# Patient Record
Sex: FEMALE | Race: WHITE | NOT HISPANIC OR LATINO | Employment: FULL TIME | URBAN - METROPOLITAN AREA
[De-identification: names, ages, dates, MRNs, and addresses within clinical notes are randomized per-mention and may not be internally consistent; named-entity substitution may affect disease eponyms.]

---

## 2017-01-09 ENCOUNTER — TELEPHONE (OUTPATIENT)
Dept: OBSTETRICS AND GYNECOLOGY | Facility: CLINIC | Age: 27
End: 2017-01-09

## 2017-01-09 NOTE — TELEPHONE ENCOUNTER
----- Message from Kaleigh Alvarado sent at 1/9/2017 11:12 AM CST -----  Pt states she need to talk to you about a glucose test and a rohgem shot. Please call pt back at [phone] or at [phone] ext [phone].

## 2017-01-09 NOTE — TELEPHONE ENCOUNTER
Advised that we will do her sugar and rhogam at 28 wks,  And will see her at her 24 wks weeks and then will make those appt after that tdf

## 2017-02-01 ENCOUNTER — ROUTINE PRENATAL (OUTPATIENT)
Dept: OBSTETRICS AND GYNECOLOGY | Facility: CLINIC | Age: 27
End: 2017-02-01
Payer: OTHER GOVERNMENT

## 2017-02-01 VITALS
WEIGHT: 150.81 LBS | BODY MASS INDEX: 28.49 KG/M2 | SYSTOLIC BLOOD PRESSURE: 118 MMHG | DIASTOLIC BLOOD PRESSURE: 60 MMHG

## 2017-02-01 DIAGNOSIS — Z67.91 RH NEGATIVE STATE IN ANTEPARTUM PERIOD, SECOND TRIMESTER: ICD-10-CM

## 2017-02-01 DIAGNOSIS — R30.0 DYSURIA DURING PREGNANCY IN SECOND TRIMESTER: ICD-10-CM

## 2017-02-01 DIAGNOSIS — O26.892 RH NEGATIVE STATE IN ANTEPARTUM PERIOD, SECOND TRIMESTER: ICD-10-CM

## 2017-02-01 DIAGNOSIS — Z34.80 SUPERVISION OF OTHER NORMAL PREGNANCY: Primary | ICD-10-CM

## 2017-02-01 DIAGNOSIS — O26.892 DYSURIA DURING PREGNANCY IN SECOND TRIMESTER: ICD-10-CM

## 2017-02-01 LAB
BILIRUB UR QL STRIP: NEGATIVE
CLARITY UR: ABNORMAL
COLOR UR: YELLOW
GLUCOSE UR QL STRIP: NEGATIVE
HGB UR QL STRIP: NEGATIVE
KETONES UR QL STRIP: NEGATIVE
LEUKOCYTE ESTERASE UR QL STRIP: NEGATIVE
NITRITE UR QL STRIP: NEGATIVE
PH UR STRIP: 8 [PH] (ref 5–8)
PROT UR QL STRIP: NEGATIVE
SP GR UR STRIP: 1.02 (ref 1–1.03)
URN SPEC COLLECT METH UR: ABNORMAL

## 2017-02-01 PROCEDURE — 90715 TDAP VACCINE 7 YRS/> IM: CPT | Mod: PBBFAC,PO | Performed by: ADVANCED PRACTICE MIDWIFE

## 2017-02-01 PROCEDURE — 81003 URINALYSIS AUTO W/O SCOPE: CPT | Mod: PO

## 2017-02-01 PROCEDURE — 0502F SUBSEQUENT PRENATAL CARE: CPT | Mod: ,,, | Performed by: ADVANCED PRACTICE MIDWIFE

## 2017-02-01 PROCEDURE — 90471 IMMUNIZATION ADMIN: CPT | Mod: PBBFAC,PO | Performed by: ADVANCED PRACTICE MIDWIFE

## 2017-02-01 PROCEDURE — 99999 PR PBB SHADOW E&M-EST. PATIENT-LVL III: CPT | Mod: PBBFAC,,, | Performed by: ADVANCED PRACTICE MIDWIFE

## 2017-02-01 PROCEDURE — 99213 OFFICE O/P EST LOW 20 MIN: CPT | Mod: PBBFAC,PO | Performed by: ADVANCED PRACTICE MIDWIFE

## 2017-02-01 PROCEDURE — 87086 URINE CULTURE/COLONY COUNT: CPT

## 2017-02-01 RX ORDER — ACETAMINOPHEN 325 MG/1
325 TABLET ORAL EVERY 6 HOURS PRN
Status: ON HOLD | COMMUNITY
End: 2017-05-21 | Stop reason: HOSPADM

## 2017-02-01 RX ORDER — NITROFURANTOIN 25; 75 MG/1; MG/1
100 CAPSULE ORAL 2 TIMES DAILY
Qty: 14 CAPSULE | Refills: 0 | Status: SHIPPED | OUTPATIENT
Start: 2017-02-01 | End: 2017-02-01 | Stop reason: SDUPTHER

## 2017-02-01 RX ORDER — LISDEXAMFETAMINE DIMESYLATE 50 MG/1
CAPSULE ORAL
COMMUNITY
Start: 2016-06-25 | End: 2017-03-15

## 2017-02-01 RX ORDER — NITROFURANTOIN 25; 75 MG/1; MG/1
100 CAPSULE ORAL 2 TIMES DAILY
Qty: 14 CAPSULE | Refills: 0 | Status: SHIPPED | OUTPATIENT
Start: 2017-02-01 | End: 2017-02-08

## 2017-02-01 RX ORDER — FLUOXETINE HYDROCHLORIDE 40 MG/1
40 CAPSULE ORAL
COMMUNITY
Start: 2016-08-02 | End: 2017-03-15

## 2017-02-01 RX ORDER — CLINDAMYCIN PHOSPHATE 10 MG/G
GEL TOPICAL
COMMUNITY
Start: 2016-03-04 | End: 2017-03-15

## 2017-02-01 NOTE — MR AVS SNAPSHOT
[facility]  [address] [facility] [address]  [place] [address]  Phone: [phone]  Fax: [phone]                  Venessa Hernandez   2017 8:00 AM   Routine Prenatal    Description:  Female : 1990   Provider:  Anneliese Ott CNM   Department:  [facility]           Reason for Visit     Routine Prenatal Visit           Diagnoses this Visit        Comments    Supervision of other normal pregnancy    -  Primary     Dysuria during pregnancy in second trimester         Rh negative state in antepartum period, second trimester                To Do List           Future Appointments        Provider Department Dept Phone    2017 8:00 AM Anneliese Ott CNM [facility] - OB/ -[phone]    2017 10:00 AM LABORATORY, [facility] [phone]      Goals (5 Years of Data)     None       These Medications        Disp Refills Start End    nitrofurantoin, macrocrystal-monohydrate, (MACROBID) 100 MG capsule 14 capsule 0 2017    Take 1 capsule (100 mg total) by mouth 2 (two) times daily. - Oral    Pharmacy: [facility]/pharm[facility] #[id number] - [place] - [address] Ph #: [phone]         [facility]     [facility] - 24/7 Assistance  Registered nurses in the [facility] provide clinical advisement, health education, appointment booking, and other advisory services.  Call for this free service at [phone].             Medications           Message regarding Medications     Verify the changes and/or additions to your medication regime listed below are the same as discussed with your clinician today.  If any of these changes or additions are incorrect, please notify your healthcare provider.        START taking these NEW medications        Refills    nitrofurantoin, macrocrystal-monohydrate, (MACROBID) 100 MG capsule 0    Sig: Take 1 capsule (100 mg total) by mouth 2 (two) times daily.    Class: Normal    Route: Oral            Verify that the below list of medications is an accurate representation of the medications you are currently taking.  If none reported, the list may be blank. If incorrect, please contact your healthcare provider. Carry this list with you in case of emergency.           Current Medications     acetaminophen (TYLENOL) 325 MG tablet Take 325 mg by mouth every 6 (six) hours as needed for Pain.    PNV62/FA/OM3/DHA/EPA/FISH OIL (PRENATAL GUMMY ORAL) Take by mouth.    clindamycin phosphate 1% (CLINDAGEL) 1 % gel Apply to the affected area daily.    doxylamine-pyridoxine 10-10 mg TbEC Take 1 tablet by mouth 2 (two) times daily.    fluoxetine (PROZAC) 40 MG capsule Take 40 mg by mouth.    fluoxetine (PROZAC) 40 MG capsule Take 40 mg by mouth.    lisdexamfetamine (VYVANSE) 50 MG capsule TAKE ONE CAPSULE BY MOUTH DAILY FOR 30 DAYS    nitrofurantoin, macrocrystal-monohydrate, (MACROBID) 100 MG capsule Take 1 capsule (100 mg total) by mouth 2 (two) times daily.    ondansetron (ZOFRAN, AS HYDROCHLORIDE,) 4 MG tablet Take 1 tablet (4 mg total) by mouth daily as needed for Nausea.    VYVANSE 50 mg capsule TAKE ONE CAPSULE BY MOUTH DAILY FOR 30 DAYS           Clinical Reference Information           Prenatal Vitals     Enc. Date GA Prenatal Vitals Prenatal Pulse Pain Level Urine Albumin/Glucose Edema Presentation Dilation/Effacement/Station    2/1/17 24w5d 118/60 / 68.4 kg (150 lb 12.7 oz) 25 cm / 151 / Present   Trace / Negative None / None / None      12/28/16 19w5d 118/60 / 64 kg (141 lb 1.5 oz) 20 cm / us / Present    None / None / None      11/30/16 15w5d 118/62 / 62 kg (136 lb 11 oz) 16 cm / 152 / Present    None / None / None      10/14/16 9w0d 120/60 / 133 kg (293 lb 3.4 oz)  / us 180 / Absent    None / None / None  0      Vital Signs - Last Recorded  Most recent update: 2/1/2017  8:28 AM by Carol Marrufo MA    BP Wt LMP BMI       118/60 68.4 kg (150 lb 12.7 oz) 08/12/2016 28.49 kg/m2       Allergies as of  2017     No Known Allergies      Immunizations Administered on Date of Encounter - 2017     Name Date Dose VIS Date Route    TDAP  Incomplete 0.5 mL 2015 Intramuscular      Orders Placed During Today's Visit      Normal Orders This Visit    Rho(D) Immune Globulin     Tdap Vaccine (Adult)     Urinalysis     Urine culture     Future Labs/Procedures Expected by Expires    CBC auto differential  2017    OB Glucose Screen  2017    Type & Screen  2017    HIV-1 and HIV-2 antibodies  3/1/2017 2018    RPR  3/1/2017 2018      Instructions      Nutrition During Pregnancy    Having a healthy baby depends mostly on you. What you eat matters to your baby and your health. During pregnancy, you will likely need about 300 more calories per day than before you became pregnant. Each day, try to eat the number of servings listed here for each food group. In addition, cut down on salt and caffeine. Limit the amount of sweets and high-fat foods you eat. Dont smoke or drink alcohol.  Important: See your healthcare provider as often as requested. If you have any questions, be sure to ask them.  Fruits  2 cups  Examples of 1-cup servings:  1 medium apple  1 medium orange  1 medium banana  1 cup chopped fruit  1 cup 100% fruit juice (pasteurized)  1/2 cup dried fruit Vegetables  2-1/2 to 3 cups   Examples of 1 servin cups raw, leafy greens  1 cup raw or cooked cut-up vegetables  1 cup 100% vegetable juice (pasteurized) Grains & Cereals*  6 to 8 ounces  Examples of 1-ounce servings:  1 slice bread  1/2 cup cooked rice  1/2 cup cooked cereal  1/2 cup pasta  1 ounce cold cereal Fats & Oils  6 to 8 teaspoons   Dairy**  3 cups  Examples of 1-cup servings:  1 cup milk  1 cup yogurt  1-1/2 ounces natural cheese  2 ounces processed cheese Protein---  5 to 6-1/2 ounces  Examples of 1-ounce servings:  1 egg  1 ounce of lean meat, poultry, or fish  1/4 cup cooked beans  1 tablespoon  peanut butter  1/2 ounce nuts Fluids  8 or more 8-ounce glasses  Examples:  Water  Diluted juices: Apple, orange, cranberry  Mineral water  Clear soups, broth     *Note: Choose whole grains whenever possible.  ** Note: Try to choose low-fat options; avoid soft cheeses and unpasteurized milk.  --- Notes: Avoid raw or undercooked meats, eggs, and seafood. fish, and shellfish. Also, some types of fish, like shark, swordfish, and imtiaz mackerel should not be eaten during pregnancy. Avoid hot dogs, luncheon meats, and cold cuts unless heated to steaming just prior to being served. Ask your healthcare provider about safe choices.     Prenatal supplements  A prenatal supplement is a pill that you take daily during pregnancy. It helps make sure youre getting the right amount of certain nutrients that are important to your baby. Ask your healthcare provider to help you choose the best one for you. Important nutrients during pregnancy include:  · Folic acid. It's best to start taking this supplement 1 month before you start trying to get pregnant. Folic acid helps prevent certain problems in your baby. During pregnancy, you need to take 400 micrograms (mcg) of folic acid every day for the first 2 to 3 months after conception, and then 600 mcg is needed for growing fetus and placenta.  · Iron, calcium, and vitamin D. You may also be advised to take these supplements during pregnancy. They help keep you and your baby healthy. Be sure to take them at different times because calcium makes it hard for the body to absorb iron. Taking iron with orange juice helps to increase its absorption.   © 7693-6604 [employer]. [address] [place] [address], [place] [address]. All rights reserved. This information is not intended as a substitute for professional medical care. Always follow your healthcare professional's instructions.        Pregnancy: Your Second Trimester Changes    Each day, you and your baby are changing and growing  together. Heres a quick look at whats happening to both of you.  How You Are Changing  Even when you dont notice it, your body is adapting to meet the needs of your growing baby. The changes in your body might also affect your moods.  Your body  Your uterus expands as baby grows. As the weeks go by, you will feel more pressure on your bladder, stomach, and other organs. You may notice some skin color changes on your forehead, nose, or cheeks. Freckles may darken, and moles may grow. You may notice a darker line on your abdomen between your belly button and pubic bone in the midline.  Your moods  The second trimester is often easier than the first. Still, be prepared for mood swings. These are due to the increase in hormones (chemicals that affect the way organs work) produced by your body. These mood swings are a normal part of pregnancy.  How your baby is growing       Month 4  Babys heartbeat may be heard with a Doppler (hand-held ultrasound device) by 9 to 10 weeks. Eyebrows, eyelashes and fingernails begin to form.  Month 5  You may feel your baby move. After a growth spurt, your baby nears 10 inches. Month 6  Babys fingerprints have formed. Your baby weighs about 1  to 2 pounds and is about 12 inches long.   © 6261-8796 [employer]. [address] [place] [address], [place] [address]. All rights reserved. This information is not intended as a substitute for professional medical care. Always follow your healthcare professional's instructions.        If You Are Rh Negative     A Rho(D) immune globulin injection protects against Rh disease in this and future pregnancies.   If youre Rh negative, ask your healthcare provider about getting treated with Rho(D) immune globulin. Even if you miscarry or dont deliver the baby, you will still need treatment. The health of any baby you have in the future depends on it.  When are you treated?  If your blood has not formed Rh antibodies, youll be treated during  week 28 of your pregnancy. You also may be treated any time theres a chance that fetal blood has mixed with yours. For example, this might be after an amniocentesis, a prenatal test. Or it might be if you have vaginal bleeding earlier than 28 weeks. Treatment is an injection of a medicine called Rho(D) immune globulin. Rho(D) immune globulin stops Rh antibodies from forming. It wont harm you or the fetus. After you give birth, your babys blood will be tested. If its Rh positive, youll be given Rho(D) immune globulin again within 3 days. If its Rh negative, you wont need Rho(D) immune globulin until your next pregnancy.  Preventing future problems  Your chance of forming Rh antibodies increases with each pregnancy. This is true even for an ectopic pregnancy (the fertilized egg is outside the uterus). It is also true for pregnancies that end in miscarriage or . In these cases, you will most likely get a Rho(D) immune globulin injection. This is because your body can make Rh antibodies even if you dont deliver a baby. Rh antibodies can cause problems in future pregnancies.  If you have Rh antibodies  If antibodies have already formed (sensitization), Rho(D) immune globulin cant protect the fetus. You and the fetus will need special care during pregnancy. Your healthcare provider will explain the details to you.   © 4060-0409 [employer]. [address] [place] [address], [place] [address]. All rights reserved. This information is not intended as a substitute for professional medical care. Always follow your healthcare professional's instructions.        Rh-Negative Screening    If you have Rh-negative blood, your baby may be at risk for health problems. This is true only if your baby has Rh-positive blood. A simple test followed by treatment can help prevent problems.  What are the risks?  If the blood of your baby is Rh positive, your Rh-negative blood may form antibodies. These antibodies will  attack the Rh-positive blood. This is called Rh disease. Rh disease can cause your baby to lose blood cells or have other health problems. Medical treatment can prevent Rh disease by keeping antibodies from forming.  How are you tested?  A simple blood test shows if youre Rh negative. This test is done very early in your pregnancy. If youre Rh negative, youll have a second blood test near week 28 of pregnancy. This test will check whether or not your blood contains Rh antibodies.  © 4518-0163 [employer]. [address] [place] [address], [place] [address]. All rights reserved. This information is not intended as a substitute for professional medical care. Always follow your healthcare professional's instructions.

## 2017-02-01 NOTE — PATIENT INSTRUCTIONS
Nutrition During Pregnancy    Having a healthy baby depends mostly on you. What you eat matters to your baby and your health. During pregnancy, you will likely need about 300 more calories per day than before you became pregnant. Each day, try to eat the number of servings listed here for each food group. In addition, cut down on salt and caffeine. Limit the amount of sweets and high-fat foods you eat. Dont smoke or drink alcohol.  Important: See your healthcare provider as often as requested. If you have any questions, be sure to ask them.  Fruits  2 cups  Examples of 1-cup servings:  1 medium apple  1 medium orange  1 medium banana  1 cup chopped fruit  1 cup 100% fruit juice (pasteurized)  1/2 cup dried fruit Vegetables  2-1/2 to 3 cups   Examples of 1 servin cups raw, leafy greens  1 cup raw or cooked cut-up vegetables  1 cup 100% vegetable juice (pasteurized) Grains & Cereals*  6 to 8 ounces  Examples of 1-ounce servings:  1 slice bread  1/2 cup cooked rice  1/2 cup cooked cereal  1/2 cup pasta  1 ounce cold cereal Fats & Oils  6 to 8 teaspoons   Dairy**  3 cups  Examples of 1-cup servings:  1 cup milk  1 cup yogurt  1-1/2 ounces natural cheese  2 ounces processed cheese Protein---  5 to 6-1/2 ounces  Examples of 1-ounce servings:  1 egg  1 ounce of lean meat, poultry, or fish  1/4 cup cooked beans  1 tablespoon peanut butter  1/2 ounce nuts Fluids  8 or more 8-ounce glasses  Examples:  Water  Diluted juices: Apple, orange, cranberry  Mineral water  Clear soups, broth     *Note: Choose whole grains whenever possible.  ** Note: Try to choose low-fat options; avoid soft cheeses and unpasteurized milk.  --- Notes: Avoid raw or undercooked meats, eggs, and seafood. fish, and shellfish. Also, some types of fish, like shark, swordfish, and imtiaz mackerel should not be eaten during pregnancy. Avoid hot dogs, luncheon meats, and cold cuts unless heated to steaming just prior to being served. Ask your healthcare  provider about safe choices.     Prenatal supplements  A prenatal supplement is a pill that you take daily during pregnancy. It helps make sure youre getting the right amount of certain nutrients that are important to your baby. Ask your healthcare provider to help you choose the best one for you. Important nutrients during pregnancy include:  · Folic acid. It's best to start taking this supplement 1 month before you start trying to get pregnant. Folic acid helps prevent certain problems in your baby. During pregnancy, you need to take 400 micrograms (mcg) of folic acid every day for the first 2 to 3 months after conception, and then 600 mcg is needed for growing fetus and placenta.  · Iron, calcium, and vitamin D. You may also be advised to take these supplements during pregnancy. They help keep you and your baby healthy. Be sure to take them at different times because calcium makes it hard for the body to absorb iron. Taking iron with orange juice helps to increase its absorption.   © 3244-3362 [employer]. [address] [place] [address]. All rights reserved. This information is not intended as a substitute for professional medical care. Always follow your healthcare professional's instructions.        Pregnancy: Your Second Trimester Changes    Each day, you and your baby are changing and growing together. Heres a quick look at whats happening to both of you.  How You Are Changing  Even when you dont notice it, your body is adapting to meet the needs of your growing baby. The changes in your body might also affect your moods.  Your body  Your uterus expands as baby grows. As the weeks go by, you will feel more pressure on your bladder, stomach, and other organs. You may notice some skin color changes on your forehead, nose, or cheeks. Freckles may darken, and moles may grow. You may notice a darker line on your abdomen between your belly button and pubic bone in the midline.  Your  moods  The second trimester is often easier than the first. Still, be prepared for mood swings. These are due to the increase in hormones (chemicals that affect the way organs work) produced by your body. These mood swings are a normal part of pregnancy.  How your baby is growing       Month 4  Babys heartbeat may be heard with a Doppler (hand-held ultrasound device) by 9 to 10 weeks. Eyebrows, eyelashes and fingernails begin to form.  Month 5  You may feel your baby move. After a growth spurt, your baby nears 10 inches. Month 6  Babys fingerprints have formed. Your baby weighs about 1  to 2 pounds and is about 12 inches long.   © 4872-1689 [employer]. [address] [place] [address], [place] [address]. All rights reserved. This information is not intended as a substitute for professional medical care. Always follow your healthcare professional's instructions.        If You Are Rh Negative     A Rho(D) immune globulin injection protects against Rh disease in this and future pregnancies.   If youre Rh negative, ask your healthcare provider about getting treated with Rho(D) immune globulin. Even if you miscarry or dont deliver the baby, you will still need treatment. The health of any baby you have in the future depends on it.  When are you treated?  If your blood has not formed Rh antibodies, youll be treated during week 28 of your pregnancy. You also may be treated any time theres a chance that fetal blood has mixed with yours. For example, this might be after an amniocentesis, a prenatal test. Or it might be if you have vaginal bleeding earlier than 28 weeks. Treatment is an injection of a medicine called Rho(D) immune globulin. Rho(D) immune globulin stops Rh antibodies from forming. It wont harm you or the fetus. After you give birth, your babys blood will be tested. If its Rh positive, youll be given Rho(D) immune globulin again within 3 days. If its Rh negative, you wont need Rho(D) immune  globulin until your next pregnancy.  Preventing future problems  Your chance of forming Rh antibodies increases with each pregnancy. This is true even for an ectopic pregnancy (the fertilized egg is outside the uterus). It is also true for pregnancies that end in miscarriage or . In these cases, you will most likely get a Rho(D) immune globulin injection. This is because your body can make Rh antibodies even if you dont deliver a baby. Rh antibodies can cause problems in future pregnancies.  If you have Rh antibodies  If antibodies have already formed (sensitization), Rho(D) immune globulin cant protect the fetus. You and the fetus will need special care during pregnancy. Your healthcare provider will explain the details to you.   © 0955-4804 [employer]. [address] [place] [address]. All rights reserved. This information is not intended as a substitute for professional medical care. Always follow your healthcare professional's instructions.        Rh-Negative Screening    If you have Rh-negative blood, your baby may be at risk for health problems. This is true only if your baby has Rh-positive blood. A simple test followed by treatment can help prevent problems.  What are the risks?  If the blood of your baby is Rh positive, your Rh-negative blood may form antibodies. These antibodies will attack the Rh-positive blood. This is called Rh disease. Rh disease can cause your baby to lose blood cells or have other health problems. Medical treatment can prevent Rh disease by keeping antibodies from forming.  How are you tested?  A simple blood test shows if youre Rh negative. This test is done very early in your pregnancy. If youre Rh negative, youll have a second blood test near week 28 of pregnancy. This test will check whether or not your blood contains Rh antibodies.  © 6645-5188 [employer]. [address] [place] [address]. All rights reserved. This  information is not intended as a substitute for professional medical care. Always follow your healthcare professional's instructions.

## 2017-02-01 NOTE — PROGRESS NOTES
28 week lab with RhoGam work up in 3 weeks  UTI symptoms x 1 week  will send UA and C/S and start Macrobid  Tdap today  Natural family planning    Coffective counseling sheet [employer] Love discussed with mother. Reinforced immediate skin to skin, the magic first hour, importance of the first feeding and delaying routine procedures. Encouraged mother to download [employer] mobile nicci if she has not already done so. Mother verbalizes understanding.

## 2017-02-01 NOTE — PROGRESS NOTES
Tdap given IM to left deltoid.  Pt tolerated well.  Pt advised to wait 10-15 minutes for s/s of medication reaction, pt declined.  RUTHIE STALEYN

## 2017-02-02 LAB — BACTERIA UR CULT: NO GROWTH

## 2017-02-16 ENCOUNTER — PATIENT MESSAGE (OUTPATIENT)
Dept: OBSTETRICS AND GYNECOLOGY | Facility: CLINIC | Age: 27
End: 2017-02-16

## 2017-02-17 ENCOUNTER — TELEPHONE (OUTPATIENT)
Dept: OBSTETRICS AND GYNECOLOGY | Facility: CLINIC | Age: 27
End: 2017-02-17

## 2017-02-17 NOTE — TELEPHONE ENCOUNTER
"Pt states at her last visit she c/o felix king.Lasting for 20- 40 seconds, has had about 4 an hour. Also states she has a thin, clear, odorless discharge as of today. "Almost looks like I wet my pants" . Pt isn't scheduled until 2/22/17, needs to know if she should go to the ER.   "

## 2017-02-17 NOTE — TELEPHONE ENCOUNTER
----- Message from Dipti Fraser sent at 2/17/2017  9:48 AM CST -----  Patient returning nurse call. Please adv/call [phone] ext [phone] or [phone].//thanks. cw

## 2017-02-17 NOTE — TELEPHONE ENCOUNTER
----- Message from Magdalene Lopez sent at 2/17/2017  2:25 PM CST -----  Patient states that she was returning your call.   Call her at [phone].                                                       sanchez

## 2017-02-20 ENCOUNTER — HOSPITAL ENCOUNTER (OUTPATIENT)
Facility: HOSPITAL | Age: 27
Discharge: HOME | End: 2017-02-21
Attending: OBSTETRICS & GYNECOLOGY | Admitting: OBSTETRICS & GYNECOLOGY
Payer: OTHER GOVERNMENT

## 2017-02-20 ENCOUNTER — PATIENT MESSAGE (OUTPATIENT)
Dept: OBSTETRICS AND GYNECOLOGY | Facility: CLINIC | Age: 27
End: 2017-02-20

## 2017-02-20 VITALS
DIASTOLIC BLOOD PRESSURE: 65 MMHG | TEMPERATURE: 98 F | HEIGHT: 61 IN | WEIGHT: 150 LBS | BODY MASS INDEX: 28.32 KG/M2 | HEART RATE: 85 BPM | RESPIRATION RATE: 18 BRPM | SYSTOLIC BLOOD PRESSURE: 110 MMHG

## 2017-02-20 DIAGNOSIS — O47.9 BRAXTON HICKS CONTRACTIONS: ICD-10-CM

## 2017-02-20 LAB — FIBRONECTIN FETAL SPEC QL: NEGATIVE

## 2017-02-20 PROCEDURE — 59025 FETAL NON-STRESS TEST: CPT

## 2017-02-20 PROCEDURE — 82731 ASSAY OF FETAL FIBRONECTIN: CPT

## 2017-02-20 PROCEDURE — 99211 OFF/OP EST MAY X REQ PHY/QHP: CPT | Mod: 25

## 2017-02-20 NOTE — TELEPHONE ENCOUNTER
Spoke with pt, given advice to go to L& D for observation. Pt states cramping is intense. Pt verbalized understanding.

## 2017-02-20 NOTE — IP AVS SNAPSHOT
[facility]  [id number] [place] [address] [address] [place] [id number]           Patient Discharge Instructions     Our goal is to set you up for success. This packet includes information on your condition, medications, and your home care. It will help you to care for yourself so you don't get sicker and need to go back to the hospital.     Please ask your nurse if you have any questions.        There are many details to remember when preparing to leave the hospital. Here is what you will need to do:    1. Take your medicine. If you are prescribed medications, review your Medication List in the following pages. You may have new medications to  at the pharmacy and others that you'll need to stop taking. Review the instructions for how and when to take your medications. Talk with your doctor or nurses if you are unsure of what to do.     2. Go to your follow-up appointments. Specific follow-up information is listed in the following pages. Your may be contacted by a transition nurse or clinical provider about future appointments. Be sure we have all of the phone numbers to reach you, if needed. Please contact your provider's office if you are unable to make an appointment.     3. Watch for warning signs. Your doctor or nurse will give you detailed warning signs to watch for and when to call for assistance. These instructions may also include educational information about your condition. If you experience any of warning signs to your health, call your doctor.               [facility]  Unless otherwise directed by your provider, please contact [facility], our nurse care line that is available for 24/7 assistance.     [phone] (toll-free)    Registered nurses in the [facility] provide clinical advisement, health education, appointment booking, and other advisory services.                    ** Verify the list of medication(s) below is accurate and up to date. Carry this with you  in case of emergency. If your medications have changed, please notify your healthcare provider.             Medication List      ASK your doctor about these medications        Additional Info                      acetaminophen 325 MG tablet   Commonly known as:  TYLENOL   Refills:  0   Dose:  325 mg    Instructions:  Take 325 mg by mouth every 6 (six) hours as needed for Pain.     Begin Date    AM    Noon    PM    Bedtime       clindamycin phosphate 1% 1 % gel   Commonly known as:  CLINDAGEL   Refills:  0    Instructions:  Apply to the affected area daily.     Begin Date    AM    Noon    PM    Bedtime       doxylamine-pyridoxine 10-10 mg Tbec   Quantity:  20 tablet   Refills:  3   Dose:  1 tablet    Instructions:  Take 1 tablet by mouth 2 (two) times daily.     Begin Date    AM    Noon    PM    Bedtime       ondansetron 4 MG tablet   Commonly known as:  ZOFRAN (AS HYDROCHLORIDE)   Quantity:  30 tablet   Refills:  2   Dose:  4 mg    Instructions:  Take 1 tablet (4 mg total) by mouth daily as needed for Nausea.     Begin Date    AM    Noon    PM    Bedtime       PRENATAL GUMMY ORAL   Refills:  0    Instructions:  Take by mouth.     Begin Date    AM    Noon    PM    Bedtime       * PROZAC 40 MG capsule   Refills:  0   Dose:  40 mg   Generic drug:  fluoxetine    Instructions:  Take 40 mg by mouth.     Begin Date    AM    Noon    PM    Bedtime       * fluoxetine 40 MG capsule   Commonly known as:  PROZAC   Refills:  0   Dose:  40 mg    Instructions:  Take 40 mg by mouth.     Begin Date    AM    Noon    PM    Bedtime       * VYVANSE 50 MG capsule   Refills:  0   Generic drug:  lisdexamfetamine    Instructions:  TAKE ONE CAPSULE BY MOUTH DAILY FOR 30 DAYS     Begin Date    AM    Noon    PM    Bedtime       * lisdexamfetamine 50 MG capsule   Commonly known as:  VYVANSE   Refills:  0    Instructions:  TAKE ONE CAPSULE BY MOUTH DAILY FOR 30 DAYS     Begin Date    AM    Noon    PM    Bedtime       * Notice:  This list has 4  medication(s) that are the same as other medications prescribed for you. Read the directions carefully, and ask your doctor or other care provider to review them with you.               Please bring to all follow up appointments:    1. A copy of your discharge instructions.  2. All medicines you are currently taking in their original bottles.  3. Identification and insurance card.    Please arrive 15 minutes ahead of scheduled appointment time.    Please call 24 hours in advance if you must reschedule your appointment and/or time.        Your Scheduled Appointments     2017  8:00 AM CST   Routine Prenatal Visit with Anneliese Ott CNM   [facility] - OB/ GYN ([facility])    [address] [facility] [place] [address]   [phone]            2017 10:00 AM CST   Non-Fasting Lab with [facility] ([facility])    [address] [facility] [place] [address]   [phone]                  Discharge Instructions        Discharge Instructions    Self Care Instructions:    Diet:  · Eat from the five basic food groups  · Fruits and proteins are good choices  · Limit fast foods and added salt/sugar  · Moderate carbonated and caffeine drinks    Hydration:  · Drink at least 8 large glasses of water a day    Kick Counts:  · After a meal, rest on your side and note the baby's movements until you have 8-10 movements in a 2 hour counting period.    · If you do not feel your baby move 8-10 times within 2 hours or you sense a change in the type or character of the baby's movement, you should come in to the hospital at once.  · Remember; your baby can sleep for 20-40 minutes at a time.      When to notify your provider:    · Vaginal bleeding like a period;  You may spot if we examined your cervix.  · If your water breaks, come to the birth center.  Note time, color and odor.  · Abdominal tenderness or pain that does not go away  · Contractions every 3 to 5 minutes for 1 to 2 hours.  True  "contractions move from front to back, are regular; usually get longer, stronger and closer together and do not stop if you change your position or activity.  · Any burning, urgency or frequency in relation to emptying your bladder.  · Any temperature greater than 100.4 degrees, chills, flu-like symptoms          Admission Information     Date & Time Provider Department CSN    2/20/2017  5:14 PM Saray Navarro MD [facility] -  [id number]      Care Providers     Provider Role Specialty Primary office phone    Saray Navarro MD Attending Provider Gynecology [phone]      Your Vitals Were     BP Pulse Temp Resp Height Weight    110/65 85 98.3 °F (36.8 °C) (Oral) 18 5' 1" (1.549 m) 68 kg (150 lb)    Last Period BMI             08/12/2016 28.34 kg/m2         Recent Lab Values     No lab values to display.      Allergies as of 2/20/2017     No Known Allergies      Advance Directives     An advance directive is a document which, in the event you are no longer able to make decisions for yourself, tells your healthcare team what kind of treatment you do or do not want to receive, or who you would like to make those decisions for you.  If you do not currently have an advance directive, [facility] encourages you to create one.  For more information call:  [phone]), [phone] ([phone]),  or log on to [email or web address].        Language Assistance Services     ATTENTION: Language assistance services are available, free of charge. Please call [phone].      ATENCIÓN: Si habla español, tiene a cordova disposición servicios gratuitos de asistencia lingüística. Llame al [phone].     CHÚ Ý: N?u b?n nói Ti?ng Vi?t, có các d?ch v? h? tr? ngôn ng? mi?n phí dành cho b?n. G?i s? [phone].         [facility] -  complies with applicable Federal civil rights laws and does not discriminate on the basis of race, color, national origin, age, disability, or sex.   "

## 2017-02-21 PROBLEM — O47.9 BRAXTON HICKS CONTRACTIONS: Status: ACTIVE | Noted: 2017-02-21

## 2017-02-21 PROCEDURE — 59025 FETAL NON-STRESS TEST: CPT | Mod: 26,,, | Performed by: ADVANCED PRACTICE MIDWIFE

## 2017-02-21 PROCEDURE — 99213 OFFICE O/P EST LOW 20 MIN: CPT | Mod: 25,,, | Performed by: ADVANCED PRACTICE MIDWIFE

## 2017-02-21 RX ORDER — ONDANSETRON 8 MG/1
8 TABLET, ORALLY DISINTEGRATING ORAL EVERY 8 HOURS PRN
Status: DISCONTINUED | OUTPATIENT
Start: 2017-02-21 | End: 2017-02-21 | Stop reason: HOSPADM

## 2017-02-21 RX ORDER — ACETAMINOPHEN 500 MG
500 TABLET ORAL EVERY 6 HOURS PRN
Status: DISCONTINUED | OUTPATIENT
Start: 2017-02-21 | End: 2017-02-21 | Stop reason: HOSPADM

## 2017-02-21 NOTE — DISCHARGE SUMMARY
[facility] -   Discharge Summary  Obstetrics - Triage      Admit Date: 2017    Discharge Date and Time: 17 at 2010s    Attending Physician: Saray Navarro MD     Discharge Provider: Anneliese Ott    Reason for Admission: [facility]'s    Hospital Course (synopsis of major diagnoses, care, treatment, and services provided during the course of the hospital stay):     Venessa Hernandez is a 27 y.o.  female who presented to labor and delivery with the above chief complaint. This was associated with being at work and standing on feet all day.    She was admitted to the labor and delivery triage area. Vital signs on admit were: Temp: 98.3 °F (36.8 °C), Pulse: 81, Resp: 18, BP: 122/71,  .    Laboratory review was significant for negative FFN and UA.        The following interventions were performed NST.    Patient was reassessed and reassured.    Consults: none    Significant Diagnostic Studies: Labs: FFN and UA    Final Diagnoses:    Principal Problem: [place] Arshad contractions   Secondary Diagnoses:   Active Hospital Problems    Diagnosis  POA    *[place] Arshad contractions [O47.9]  Yes      Resolved Hospital Problems    Diagnosis Date Resolved POA   No resolved problems to display.       Discharged Condition: good    Disposition: Home or Self Care    Follow Up/Patient Instructions:     Medications:  Reconciled Home Medications:   Current Discharge Medication List      CONTINUE these medications which have NOT CHANGED    Details   PNV62/FA/OM3/DHA/EPA/FISH OIL (PRENATAL GUMMY ORAL) Take by mouth.      acetaminophen (TYLENOL) 325 MG tablet Take 325 mg by mouth every 6 (six) hours as needed for Pain.      clindamycin phosphate 1% (CLINDAGEL) 1 % gel Apply to the affected area daily.      doxylamine-pyridoxine 10-10 mg TbEC Take 1 tablet by mouth 2 (two) times daily.  Qty: 20 tablet, Refills: 3      !! fluoxetine (PROZAC) 40 MG capsule Take 40 mg by mouth.      !! fluoxetine (PROZAC) 40  MG capsule Take 40 mg by mouth.      !! lisdexamfetamine (VYVANSE) 50 MG capsule TAKE ONE CAPSULE BY MOUTH DAILY FOR 30 DAYS      ondansetron (ZOFRAN, AS HYDROCHLORIDE,) 4 MG tablet Take 1 tablet (4 mg total) by mouth daily as needed for Nausea.  Qty: 30 tablet, Refills: 2    Associated Diagnoses: Vomiting, intractability of vomiting not specified, presence of nausea not specified, unspecified vomiting type      !! VYVANSE 50 mg capsule TAKE ONE CAPSULE BY MOUTH DAILY FOR 30 DAYS  Refills: 0       !! - Potential duplicate medications found. Please discuss with provider.          Discharge Procedure Orders  Diet general     Activity as tolerated     Other restrictions (specify):   Order Comments: Avoid driving for 2 weeks     Call MD for:  temperature >100.4     Call MD for:  persistent nausea and vomiting or diarrhea     Call MD for:   Scheduling Instructions: PTL precautions, or any concerns

## 2017-02-22 ENCOUNTER — ROUTINE PRENATAL (OUTPATIENT)
Dept: OBSTETRICS AND GYNECOLOGY | Facility: CLINIC | Age: 27
End: 2017-02-22
Payer: OTHER GOVERNMENT

## 2017-02-22 ENCOUNTER — LAB VISIT (OUTPATIENT)
Dept: LAB | Facility: HOSPITAL | Age: 27
End: 2017-02-22
Attending: ADVANCED PRACTICE MIDWIFE
Payer: OTHER GOVERNMENT

## 2017-02-22 VITALS — WEIGHT: 156.5 LBS | BODY MASS INDEX: 29.58 KG/M2 | DIASTOLIC BLOOD PRESSURE: 68 MMHG | SYSTOLIC BLOOD PRESSURE: 106 MMHG

## 2017-02-22 DIAGNOSIS — Z34.80 SUPERVISION OF OTHER NORMAL PREGNANCY: Primary | ICD-10-CM

## 2017-02-22 DIAGNOSIS — O26.892 RH NEGATIVE STATUS DURING PREGNANCY IN SECOND TRIMESTER, ANTEPARTUM: ICD-10-CM

## 2017-02-22 DIAGNOSIS — Z34.80 SUPERVISION OF OTHER NORMAL PREGNANCY: ICD-10-CM

## 2017-02-22 DIAGNOSIS — Z67.91 RH NEGATIVE STATUS DURING PREGNANCY IN SECOND TRIMESTER, ANTEPARTUM: ICD-10-CM

## 2017-02-22 DIAGNOSIS — Z67.91 RH NEGATIVE STATE IN ANTEPARTUM PERIOD, SECOND TRIMESTER: ICD-10-CM

## 2017-02-22 DIAGNOSIS — O26.892 RH NEGATIVE STATE IN ANTEPARTUM PERIOD, SECOND TRIMESTER: ICD-10-CM

## 2017-02-22 LAB
ABO + RH BLD: NORMAL
BASOPHILS # BLD AUTO: 0.02 K/UL
BASOPHILS NFR BLD: 0.2 %
BLD GP AB SCN CELLS X3 SERPL QL: NORMAL
DIFFERENTIAL METHOD: ABNORMAL
EOSINOPHIL # BLD AUTO: 0.1 K/UL
EOSINOPHIL NFR BLD: 0.6 %
ERYTHROCYTE [DISTWIDTH] IN BLOOD BY AUTOMATED COUNT: 13.8 %
GLUCOSE SERPL-MCNC: 80 MG/DL
HCT VFR BLD AUTO: 30.4 %
HGB BLD-MCNC: 10 G/DL
LYMPHOCYTES # BLD AUTO: 2.3 K/UL
LYMPHOCYTES NFR BLD: 21.3 %
MCH RBC QN AUTO: 29.8 PG
MCHC RBC AUTO-ENTMCNC: 32.9 %
MCV RBC AUTO: 91 FL
MONOCYTES # BLD AUTO: 0.4 K/UL
MONOCYTES NFR BLD: 3.8 %
NEUTROPHILS # BLD AUTO: 7.9 K/UL
NEUTROPHILS NFR BLD: 73.8 %
PLATELET # BLD AUTO: 330 K/UL
PMV BLD AUTO: 8.8 FL
RBC # BLD AUTO: 3.36 M/UL
WBC # BLD AUTO: 10.63 K/UL

## 2017-02-22 PROCEDURE — 99999 PR PBB SHADOW E&M-EST. PATIENT-LVL III: CPT | Mod: PBBFAC,,, | Performed by: ADVANCED PRACTICE MIDWIFE

## 2017-02-22 PROCEDURE — 86592 SYPHILIS TEST NON-TREP QUAL: CPT

## 2017-02-22 PROCEDURE — 86703 HIV-1/HIV-2 1 RESULT ANTBDY: CPT

## 2017-02-22 PROCEDURE — 85025 COMPLETE CBC W/AUTO DIFF WBC: CPT

## 2017-02-22 PROCEDURE — 86900 BLOOD TYPING SEROLOGIC ABO: CPT

## 2017-02-22 PROCEDURE — 82950 GLUCOSE TEST: CPT

## 2017-02-22 PROCEDURE — 99213 OFFICE O/P EST LOW 20 MIN: CPT | Mod: PBBFAC,PO | Performed by: ADVANCED PRACTICE MIDWIFE

## 2017-02-22 PROCEDURE — 0502F SUBSEQUENT PRENATAL CARE: CPT | Mod: ,,, | Performed by: ADVANCED PRACTICE MIDWIFE

## 2017-02-22 PROCEDURE — 86850 RBC ANTIBODY SCREEN: CPT

## 2017-02-22 NOTE — PATIENT INSTRUCTIONS
Pregnancy: Your Third Trimester Changes  As the baby grows, your body changes too. You may also see signs that your body is getting ready for labor. Be patient. Within a few more weeks, your baby will be born.    How you are changing  Your body is preparing for the birth of your baby. Some of the most common changes are listed below. If you have any questions or concerns, ask your healthcare provider:  · Youll gain more weight from fluids, extra blood, and fat deposits.  · Your breasts will grow as your body gets ready to feed the baby. They may be more tender. You may also notice a slight yellow or white discharge from the nipples.  · Discharge from your vagina may increase. This is normal.  · You might see some skin color changes on your forehead, cheeks, or nose. Most of these will go away after you deliver.  How your baby is growing    Month 7  Your baby can open and close his or her eyes, and weighs around 4 pounds. If born prematurely (too early), your baby would likely survive with special care.   Month 8  Your baby is building up body fat, and weighs around 6 pounds.    Month 9  Your baby weighs nearly 7 pounds and is about 18 to 20 inches long. In other words, any day now...   Date Last Reviewed: 8/16/2015 © 2000-2016 [employer]. [address] [place] [address], [place] [address]. All rights reserved. This information is not intended as a substitute for professional medical care. Always follow your healthcare professional's instructions.        Labor: Preparing for the Hospital    During the final weeks of your pregnancy, you may have irregular contractions. You may feel a drop in your babys position. And the profile of your stomach may look a little different. Because due dates are not exact, have your bag packed and ready for the hospital.  Packing for the hospital  Here are some items you may want to have ready for the hospital:  · A watch or clock with a second hand  · Insurance card and  identification  · Nursing bra, nursing pads, and maternity underwear  · Toiletries  · Something to entertain yourself, like books or a handheld computer  · Heavy socks or slippers and a robe  · Clips for your hair, a brush, and lip balm  · An [employer] or other music player  · A camera or video camera and   · Important phone numbers or email addresses  · Going-home outfits for you and your baby  · A car seat to take your baby home in  Leaving for the hospital  Follow the instructions youve been given on when to leave for the hospital. You may be told to call your healthcare provider when it becomes hard to walk or talk during contractions or if your amniotic sac breaks. If your partner makes the phone call for you, be nearby. That way, your healthcare provider can speak to you directly. Many women are told to go to the hospital after an hour of contractions that come 3 to 5 minutes apart. Leave sooner if the hospital is not nearby or is hard to get to.  Date Last Reviewed: 8/16/2015 © 2000-2016 [employer]. [address] [place] [address], [place] [address]. All rights reserved. This information is not intended as a substitute for professional medical care. Always follow your healthcare professional's instructions.

## 2017-02-22 NOTE — PROGRESS NOTES
Rho(d) Immune Globulin 1500IU given IM to L deltoid using aseptic technique, no discomfort noted, pt tolerated well. Advised to wait 15 minutes to monitor adverse reactions, verbalized understanding.

## 2017-02-22 NOTE — MR AVS SNAPSHOT
[facility] - OB/ GYN  [address] [facility] [place] [address]  Phone: [phone]  Fax: [phone]                  Venessa Hernandez   2017 8:00 AM   Routine Prenatal    Description:  Female : 1990   Provider:  Anneliese Ott CNM   Department:  [facility] - OB/ GYN           Reason for Visit     Routine Prenatal Visit           Diagnoses this Visit        Comments    Supervision of other normal pregnancy    -  Primary     Rh negative status during pregnancy in second trimester, antepartum                To Do List           Future Appointments        Provider Department Dept Phone    2017 10:00 AM [facility] [phone]    3/8/2017 7:45 AM Anneliese Ott CNM [facility] OB/ [facility] [phone]    3/15/2017 7:45 AM Anneliese Ott CNM [facility] OB/ [facility] [phone]      Goals (5 Years of Data)     None      [facility]     [facility] -  Assistance  Registered nurses in the [facility] provide clinical advisement, health education, appointment booking, and other advisory services.  Call for this free service at [phone].             Medications           Message regarding Medications     Verify the changes and/or additions to your medication regime listed below are the same as discussed with your clinician today.  If any of these changes or additions are incorrect, please notify your healthcare provider.             Verify that the below list of medications is an accurate representation of the medications you are currently taking.  If none reported, the list may be blank. If incorrect, please contact your healthcare provider. Carry this list with you in case of emergency.           Current Medications     acetaminophen (TYLENOL) 325 MG tablet Take 325 mg by mouth every 6 (six) hours as needed for Pain.    clindamycin phosphate 1% (CLINDAGEL) 1 % gel Apply to the affected area daily.    doxylamine-pyridoxine 10-10 mg  "TbEC Take 1 tablet by mouth 2 (two) times daily.    fluoxetine (PROZAC) 40 MG capsule Take 40 mg by mouth.    fluoxetine (PROZAC) 40 MG capsule Take 40 mg by mouth.    lisdexamfetamine (VYVANSE) 50 MG capsule TAKE ONE CAPSULE BY MOUTH DAILY FOR 30 DAYS    ondansetron (ZOFRAN, AS HYDROCHLORIDE,) 4 MG tablet Take 1 tablet (4 mg total) by mouth daily as needed for Nausea.    PNV62/FA/OM3/DHA/EPA/FISH OIL (PRENATAL GUMMY ORAL) Take by mouth.    VYVANSE 50 mg capsule TAKE ONE CAPSULE BY MOUTH DAILY FOR 30 DAYS           Clinical Reference Information           Prenatal Vitals     Enc. Date GA Prenatal Vitals Prenatal Pulse Pain Level Urine Albumin/Glucose Edema Presentation Dilation/Effacement/Station    2/22/17 27w5d 106/68 / 71 kg (156 lb 8.4 oz) 28 cm / 150 / Present    None / None / None      2/20/17 27w3d Admission Dept: [facility] L&D    2/1/17 24w5d 118/60 / 68.4 kg (150 lb 12.7 oz) 25 cm / 151 / Present   Trace / Negative None / None / None      12/28/16 19w5d 118/60 / 64 kg (141 lb 1.5 oz) 20 cm / us / Present    None / None / None      11/30/16 15w5d 118/62 / 62 kg (136 lb 11 oz) 16 cm / 152 / Present    None / None / None      10/14/16 9w0d 120/60 / 133 kg (293 lb 3.4 oz)  / us 180 / Absent    None / None / None  0       Height: 5' 1" (1.549 m)       Your Vitals Were     BP Weight Last Period BMI       106/68 71 kg (156 lb 8.4 oz) 08/12/2016 29.58 kg/m2       Allergies as of 2/22/2017     No Known Allergies      Immunizations Administered on Date of Encounter - 2/22/2017     None      Orders Placed During Today's Visit      Normal Orders This Visit    Rho(D) Immune Globulin       Instructions      Pregnancy: Your Third Trimester Changes  As the baby grows, your body changes too. You may also see signs that your body is getting ready for labor. Be patient. Within a few more weeks, your baby will be born.    How you are changing  Your body is preparing for the birth of your baby. Some of the most common changes are " listed below. If you have any questions or concerns, ask your healthcare provider:  · Youll gain more weight from fluids, extra blood, and fat deposits.  · Your breasts will grow as your body gets ready to feed the baby. They may be more tender. You may also notice a slight yellow or white discharge from the nipples.  · Discharge from your vagina may increase. This is normal.  · You might see some skin color changes on your forehead, cheeks, or nose. Most of these will go away after you deliver.  How your baby is growing    Month 7  Your baby can open and close his or her eyes, and weighs around 4 pounds. If born prematurely (too early), your baby would likely survive with special care.   Month 8  Your baby is building up body fat, and weighs around 6 pounds.    Month 9  Your baby weighs nearly 7 pounds and is about 18 to 20 inches long. In other words, any day now...   Date Last Reviewed: 8/16/2015 © 2000-2016 [employer]. [address] [place] [address]. All rights reserved. This information is not intended as a substitute for professional medical care. Always follow your healthcare professional's instructions.        Labor: Preparing for the Hospital    During the final weeks of your pregnancy, you may have irregular contractions. You may feel a drop in your babys position. And the profile of your stomach may look a little different. Because due dates are not exact, have your bag packed and ready for the hospital.  Packing for the hospital  Here are some items you may want to have ready for the hospital:  · A watch or clock with a second hand  · Insurance card and identification  · Nursing bra, nursing pads, and maternity underwear  · Toiletries  · Something to entertain yourself, like books or a handheld computer  · Heavy socks or slippers and a robe  · Clips for your hair, a brush, and lip balm  · An [employer] or other music player  · A camera or video camera and   · Important phone  numbers or email addresses  · Going-home outfits for you and your baby  · A car seat to take your baby home in  Leaving for the hospital  Follow the instructions youve been given on when to leave for the hospital. You may be told to call your healthcare provider when it becomes hard to walk or talk during contractions or if your amniotic sac breaks. If your partner makes the phone call for you, be nearby. That way, your healthcare provider can speak to you directly. Many women are told to go to the hospital after an hour of contractions that come 3 to 5 minutes apart. Leave sooner if the hospital is not nearby or is hard to get to.  Date Last Reviewed: 8/16/2015  © 2395-8114 [employer]. [address] [place] [address], [place] [address]. All rights reserved. This information is not intended as a substitute for professional medical care. Always follow your healthcare professional's instructions.             Language Assistance Services     ATTENTION: Language assistance services are available, free of charge. Please call [phone].      ATENCIÓN: Si habla madelin, tiene a cordova disposición servicios gratuitos de asistencia lingüística. Llame al [phone].     CHÚ Ý: N?u b?n nói Ti?ng Vi?t, có các d?ch v? h? tr? ngôn ng? mi?n phí dành cho b?n. G?i s? [phone].         [facility] complies with applicable Federal civil rights laws and does not discriminate on the basis of race, color, national origin, age, disability, or sex.

## 2017-02-22 NOTE — PROGRESS NOTES
L&D visit- PTL ruled out Negative FFN and UA  A NEG- 28 week lab with [facility] today    Coffective counseling sheet Keep Baby Close discussed with mother. Reinforced rooming in practices, continued skin to skin, and quiet hours as requested by mother.  Encouraged mother to download [employer] mobile nicci if she has not already done so. Mother verbalizes understanding.

## 2017-02-23 LAB
HIV 1+2 AB+HIV1 P24 AG SERPL QL IA: NEGATIVE
RPR SER QL: NORMAL

## 2017-03-08 ENCOUNTER — ROUTINE PRENATAL (OUTPATIENT)
Dept: OBSTETRICS AND GYNECOLOGY | Facility: CLINIC | Age: 27
End: 2017-03-08
Payer: OTHER GOVERNMENT

## 2017-03-08 VITALS
WEIGHT: 156.94 LBS | BODY MASS INDEX: 29.66 KG/M2 | SYSTOLIC BLOOD PRESSURE: 106 MMHG | DIASTOLIC BLOOD PRESSURE: 60 MMHG

## 2017-03-08 DIAGNOSIS — Z34.80 SUPERVISION OF OTHER NORMAL PREGNANCY: Primary | ICD-10-CM

## 2017-03-08 PROCEDURE — 99999 PR PBB SHADOW E&M-EST. PATIENT-LVL III: CPT | Mod: PBBFAC,,, | Performed by: ADVANCED PRACTICE MIDWIFE

## 2017-03-08 PROCEDURE — 99213 OFFICE O/P EST LOW 20 MIN: CPT | Mod: PBBFAC,PO | Performed by: ADVANCED PRACTICE MIDWIFE

## 2017-03-08 PROCEDURE — 0502F SUBSEQUENT PRENATAL CARE: CPT | Mod: ,,, | Performed by: ADVANCED PRACTICE MIDWIFE

## 2017-03-08 NOTE — MR AVS SNAPSHOT
[facility]  [address]  [place] [address]  Phone: [phone]  Fax: [phone]                  Venessa Hernandez   3/8/2017 7:45 AM   Routine Prenatal    Description:  Female : 1990   Provider:  Anneliese Ott CNM   Department:  [facility]           Reason for Visit     Routine Prenatal Visit           Diagnoses this Visit        Comments    Supervision of other normal pregnancy    -  Primary            To Do List           Future Appointments        Provider Department Dept Phone    3/15/2017 7:45 AM Anneliese Ott CNM [facility] - OB/ -[phone]    3/31/2017 2:45 PM Anneliese Ott CNM [facility] OB/ -[phone]      Goals (5 Years of Data)     None      [facility]     [facility] On Call Nurse Care Line -  Assistance  Registered nurses in the [facility] On Call Center provide clinical advisement, health education, appointment booking, and other advisory services.  Call for this free service at [phone].             Medications           Message regarding Medications     Verify the changes and/or additions to your medication regime listed below are the same as discussed with your clinician today.  If any of these changes or additions are incorrect, please notify your healthcare provider.             Verify that the below list of medications is an accurate representation of the medications you are currently taking.  If none reported, the list may be blank. If incorrect, please contact your healthcare provider. Carry this list with you in case of emergency.           Current Medications     acetaminophen (TYLENOL) 325 MG tablet Take 325 mg by mouth every 6 (six) hours as needed for Pain.    clindamycin phosphate 1% (CLINDAGEL) 1 % gel Apply to the affected area daily.    doxylamine-pyridoxine 10-10 mg TbEC Take 1 tablet by mouth 2 (two) times daily.    fluoxetine (PROZAC) 40 MG capsule Take 40 mg by mouth.    fluoxetine (PROZAC) 40 MG capsule Take 40 mg by  "mouth.    lisdexamfetamine (VYVANSE) 50 MG capsule TAKE ONE CAPSULE BY MOUTH DAILY FOR 30 DAYS    ondansetron (ZOFRAN, AS HYDROCHLORIDE,) 4 MG tablet Take 1 tablet (4 mg total) by mouth daily as needed for Nausea.    PNV62/FA/OM3/DHA/EPA/FISH OIL (PRENATAL GUMMY ORAL) Take by mouth.    VYVANSE 50 mg capsule TAKE ONE CAPSULE BY MOUTH DAILY FOR 30 DAYS           Clinical Reference Information           Prenatal Vitals     Enc. Date GA Prenatal Vitals Prenatal Pulse Pain Level Urine Albumin/Glucose Edema Presentation Dilation/Effacement/Station    3/8/17 29w5d 106/60 / 71.2 kg (156 lb 15.5 oz) 30 cm / 150 / Present    None / None / None      2/22/17 27w5d 106/68 / 71 kg (156 lb 8.4 oz) 28 cm / 150 / Present    None / None / None      2/20/17 27w3d Admission Dept: [facility] L&D    2/1/17 24w5d 118/60 / 68.4 kg (150 lb 12.7 oz) 25 cm / 151 / Present   Trace / Negative None / None / None      12/28/16 19w5d 118/60 / 64 kg (141 lb 1.5 oz) 20 cm / us / Present    None / None / None      11/30/16 15w5d 118/62 / 62 kg (136 lb 11 oz) 16 cm / 152 / Present    None / None / None      10/14/16 9w0d 120/60 / 133 kg (293 lb 3.4 oz)  / us 180 / Absent    None / None / None  0       Height: 5' 1" (1.549 m)       Your Vitals Were     BP Weight Last Period BMI       106/60 71.2 kg (156 lb 15.5 oz) 08/12/2016 29.66 kg/m2       Allergies as of 3/8/2017     No Known Allergies      Immunizations Administered on Date of Encounter - 3/8/2017     None      Instructions      Pregnancy: Your Third Trimester Changes  As the baby grows, your body changes too. You may also see signs that your body is getting ready for labor. Be patient. Within a few more weeks, your baby will be born.    How you are changing  Your body is preparing for the birth of your baby. Some of the most common changes are listed below. If you have any questions or concerns, ask your healthcare provider:  · Youll gain more weight from fluids, extra blood, and fat " deposits.  · Your breasts will grow as your body gets ready to feed the baby. They may be more tender. You may also notice a slight yellow or white discharge from the nipples.  · Discharge from your vagina may increase. This is normal.  · You might see some skin color changes on your forehead, cheeks, or nose. Most of these will go away after you deliver.  How your baby is growing    Month 7  Your baby can open and close his or her eyes, and weighs around 4 pounds. If born prematurely (too early), your baby would likely survive with special care.   Month 8  Your baby is building up body fat, and weighs around 6 pounds.    Month 9  Your baby weighs nearly 7 pounds and is about 18 to 20 inches long. In other words, any day now...   Date Last Reviewed: 8/16/2015  © 5938-8817 [employer]. [address] [place] [address], [place] [address]. All rights reserved. This information is not intended as a substitute for professional medical care. Always follow your healthcare professional's instructions.        Anemia During Pregnancy     Green leafy vegetables and nuts are a good source of iron.     Anemia is a condition in which the red blood cell count is too low. In pregnant women, this is often caused by not having enough iron in the blood. Anemia is common in pregnancy and very easy to treat.  Why you need iron  While pregnant, your body uses iron to make red blood cells for you and your fetus. These cells bring oxygen to your fetus and to the rest of your body. Not having enough red blood cells can cause your baby to be born too small. But this is rare, as its easy for you to get enough iron.  Testing for anemia  The only way to know whether you have anemia is to have a simple test called a CBC (complete blood count). This is a routine test that will be done at one of your first prenatal visits. This test may be done again, at about week 26 to week 28.  Treating anemia  If you have anemia due to low iron content,  follow the advice of your healthcare provider. Eating foods high in iron and taking supplements can help you get the iron you need.  Eating foods high in iron  Eat foods that are high in iron such as:  · Red meat (limit organ meats such as liver)  · Seafood (be sure its fully cooked), and don't eat fish that are high in mercury, such as swordfish, tilefish, imtiaz mackerel, and shark  · Tofu  · Eggs  · Green, leafy vegetables  · Whole grains and iron-fortified cereals  · Dried fruits and nuts  Taking iron supplements  In most cases, a prenatal vitamin can provide enough iron. But if you need more, your healthcare provider may prescribe an iron supplement. Swallow iron pills with a glass of orange or cranberry juice. The vitamin C in these fruit juices can help your body absorb iron. But dont take your iron pills with juices that have calcium added to them. They can keep your body from absorbing the iron.  Possible side effects  Iron supplements may have certain side effects. They may cause your stools to turn black, make you feel sick to your stomach or constipated. Here are some tips that may help you limit side effects:  · Start slowly. Take one pill a day for a few days. Then work up to your prescribed dose over time.  · Take your pills with meals, and avoid them at bedtime.  · Increase the fiber in your diet. Eat more whole grains, fruits, and vegetables.  · Do mild exercise each day.  · If recommended by your healthcare provider, take a stool softener.   Date Last Reviewed: 5/1/2016  © 1695-8461 [employer], [employer]. [address] [place] [address], [place] [address]. All rights reserved. This information is not intended as a substitute for professional medical care. Always follow your healthcare professional's instructions.             Language Assistance Services     ATTENTION: Language assistance services are available, free of charge. Please call [phone].      ATENCIÓN: bj Jordan  disposición servicios gratuitos de asistencia lingüística. Kit al [phone].     HECTOR Ý: N?u b?n nói Ti?ng Vi?t, có các d?ch v? h? tr? ngôn ng? mi?n phí dành cho b?n. G?i s? [phone].         [facility] complies with applicable Federal civil rights laws and does not discriminate on the basis of race, color, national origin, age, disability, or sex.

## 2017-03-08 NOTE — PROGRESS NOTES
Passed glucose  RhoGam 2/2/17  Anemia- Fe started  Heartburn- antacids rx - Tums/ Mylanta    Coffective counseling sheet Keep Baby Close discussed with mother. Reinforced rooming in practices, continued skin to skin, and quiet hours as requested by mother.  Encouraged mother to download Coffective mobile nicci if she has not already done so. Mother verbalizes understanding.

## 2017-03-15 ENCOUNTER — ROUTINE PRENATAL (OUTPATIENT)
Dept: OBSTETRICS AND GYNECOLOGY | Facility: CLINIC | Age: 27
End: 2017-03-15
Payer: OTHER GOVERNMENT

## 2017-03-15 VITALS
BODY MASS INDEX: 30.49 KG/M2 | SYSTOLIC BLOOD PRESSURE: 120 MMHG | DIASTOLIC BLOOD PRESSURE: 66 MMHG | WEIGHT: 161.38 LBS

## 2017-03-15 DIAGNOSIS — Z34.80 SUPERVISION OF OTHER NORMAL PREGNANCY: Primary | ICD-10-CM

## 2017-03-15 PROCEDURE — 99212 OFFICE O/P EST SF 10 MIN: CPT | Mod: PBBFAC,PO | Performed by: ADVANCED PRACTICE MIDWIFE

## 2017-03-15 PROCEDURE — 0502F SUBSEQUENT PRENATAL CARE: CPT | Mod: ,,, | Performed by: ADVANCED PRACTICE MIDWIFE

## 2017-03-15 PROCEDURE — 99999 PR PBB SHADOW E&M-EST. PATIENT-LVL II: CPT | Mod: PBBFAC,,, | Performed by: ADVANCED PRACTICE MIDWIFE

## 2017-03-15 NOTE — MR AVS SNAPSHOT
[facility]  [address]  [place] [address]  Phone: [phone]  Fax: [phone]                  Venessa Hernandez   3/15/2017 7:45 AM   Routine Prenatal    Description:  Female : 1990   Provider:  Anneliese Ott CNM   Department:  [facility]           Reason for Visit     Routine Prenatal Visit           Diagnoses this Visit        Comments    Supervision of other normal pregnancy    -  Primary            To Do List           Future Appointments        Provider Department Dept Phone    3/31/2017 2:45 PM [facility] -[phone]    2017 8:15 AM Anneliese Ott CNM [facility] OB/ -[phone]      Goals (5 Years of Data)     None      [facility]     [facility] On Call Nurse Care Line -  Assistance  Registered nurses in the [facility] Center provide clinical advisement, health education, appointment booking, and other advisory services.  Call for this free service at [phone].             Medications           Message regarding Medications     Verify the changes and/or additions to your medication regime listed below are the same as discussed with your clinician today.  If any of these changes or additions are incorrect, please notify your healthcare provider.        STOP taking these medications     fluoxetine (PROZAC) 40 MG capsule Take 40 mg by mouth.    clindamycin phosphate 1% (CLINDAGEL) 1 % gel Apply to the affected area daily.    fluoxetine (PROZAC) 40 MG capsule Take 40 mg by mouth.    VYVANSE 50 mg capsule TAKE ONE CAPSULE BY MOUTH DAILY FOR 30 DAYS    lisdexamfetamine (VYVANSE) 50 MG capsule TAKE ONE CAPSULE BY MOUTH DAILY FOR 30 DAYS    ondansetron (ZOFRAN, AS HYDROCHLORIDE,) 4 MG tablet Take 1 tablet (4 mg total) by mouth daily as needed for Nausea.    doxylamine-pyridoxine 10-10 mg TbEC Take 1 tablet by mouth 2 (two) times daily.           Verify that the below list of medications is an accurate representation of the  "medications you are currently taking.  If none reported, the list may be blank. If incorrect, please contact your healthcare provider. Carry this list with you in case of emergency.           Current Medications     acetaminophen (TYLENOL) 325 MG tablet Take 325 mg by mouth every 6 (six) hours as needed for Pain.    PNV62/FA/OM3/DHA/EPA/FISH OIL (PRENATAL GUMMY ORAL) Take by mouth.           Clinical Reference Information           Prenatal Vitals     Enc. Date GA Prenatal Vitals Prenatal Pulse Pain Level Urine Albumin/Glucose Edema Presentation Dilation/Effacement/Station    3/15/17 30w5d 120/66 / 73.2 kg (161 lb 6 oz) 30 cm / 132 / Present    None / None / None      3/8/17 29w5d 106/60 / 71.2 kg (156 lb 15.5 oz) 30 cm / 150 / Present    None / None / None      2/22/17 27w5d 106/68 / 71 kg (156 lb 8.4 oz) 28 cm / 150 / Present    None / None / None      2/20/17 27w3d Admission Dept: [facility] L&D    2/1/17 24w5d 118/60 / 68.4 kg (150 lb 12.7 oz) 25 cm / 151 / Present   Trace / Negative None / None / None      12/28/16 19w5d 118/60 / 64 kg (141 lb 1.5 oz) 20 cm / us / Present    None / None / None      11/30/16 15w5d 118/62 / 62 kg (136 lb 11 oz) 16 cm / 152 / Present    None / None / None      10/14/16 9w0d 120/60 / 133 kg (293 lb 3.4 oz)  / us 180 / Absent    None / None / None  0       Height: 5' 1" (1.549 m)       Your Vitals Were     BP Weight Last Period BMI       120/66 73.2 kg (161 lb 6 oz) 08/12/2016 30.49 kg/m2       Allergies as of 3/15/2017     No Known Allergies      Immunizations Administered on Date of Encounter - 3/15/2017     None      Language Assistance Services     ATTENTION: Language assistance services are available, free of charge. Please call [phone].      ATENCIÓN: Si habla madelin, tiene a cordova disposición servicios gratuitos de asistencia lingüística. Llame al [phone].     HECTOR Ý: N?u b?n nói Ti?ng Vi?t, có các d?ch v? h? tr? ngôn ng? mi?n phí ramilah cho b?n. G?i s? [phone].   "       [facility] complies with applicable Federal civil rights laws and does not discriminate on the basis of race, color, national origin, age, disability, or sex.

## 2017-03-15 NOTE — PROGRESS NOTES
Doing well    Coffective counseling sheet Learn Your Baby discussed with mother. Instructed regarding feeding cues and methods to calm baby. Encouraged mother to download Coffective mobile nicci if she has not already done so.  Mother verbalized understanding.

## 2017-03-31 ENCOUNTER — ROUTINE PRENATAL (OUTPATIENT)
Dept: OBSTETRICS AND GYNECOLOGY | Facility: CLINIC | Age: 27
End: 2017-03-31
Payer: OTHER GOVERNMENT

## 2017-03-31 VITALS
DIASTOLIC BLOOD PRESSURE: 72 MMHG | BODY MASS INDEX: 30.91 KG/M2 | WEIGHT: 163.56 LBS | SYSTOLIC BLOOD PRESSURE: 110 MMHG

## 2017-03-31 DIAGNOSIS — O26.899 PELVIC PAIN AFFECTING PREGNANCY: Primary | ICD-10-CM

## 2017-03-31 DIAGNOSIS — R10.2 PELVIC PAIN AFFECTING PREGNANCY: Primary | ICD-10-CM

## 2017-03-31 DIAGNOSIS — Z3A.33 33 WEEKS GESTATION OF PREGNANCY: ICD-10-CM

## 2017-03-31 LAB
BACTERIA #/AREA URNS HPF: ABNORMAL /HPF
BILIRUB UR QL STRIP: NEGATIVE
CLARITY UR: ABNORMAL
COLOR UR: YELLOW
GLUCOSE UR QL STRIP: NEGATIVE
HGB UR QL STRIP: NEGATIVE
KETONES UR QL STRIP: ABNORMAL
LEUKOCYTE ESTERASE UR QL STRIP: ABNORMAL
MICROSCOPIC COMMENT: ABNORMAL
NITRITE UR QL STRIP: NEGATIVE
PH UR STRIP: 6 [PH] (ref 5–8)
PROT UR QL STRIP: NEGATIVE
SP GR UR STRIP: 1.02 (ref 1–1.03)
SQUAMOUS #/AREA URNS HPF: 25 /HPF
URN SPEC COLLECT METH UR: ABNORMAL
WBC #/AREA URNS HPF: 5 /HPF (ref 0–5)

## 2017-03-31 PROCEDURE — 99999 PR PBB SHADOW E&M-EST. PATIENT-LVL II: CPT | Mod: PBBFAC,,, | Performed by: ADVANCED PRACTICE MIDWIFE

## 2017-03-31 PROCEDURE — 0502F SUBSEQUENT PRENATAL CARE: CPT | Mod: ,,, | Performed by: ADVANCED PRACTICE MIDWIFE

## 2017-03-31 PROCEDURE — 99212 OFFICE O/P EST SF 10 MIN: CPT | Mod: PBBFAC,PO | Performed by: ADVANCED PRACTICE MIDWIFE

## 2017-03-31 PROCEDURE — 81000 URINALYSIS NONAUTO W/SCOPE: CPT | Mod: PO

## 2017-03-31 NOTE — PROGRESS NOTES
Having a lot of pelvic pressure and groin pain that is consistent with ligament pain, worse when going from sitting to standing. Having irregular contractions that are relieved with rest and hydration, no more than 6/hour. Patient is on her feet a lot at work and moves from standing to sitting frequently. Recommendations made, including maternity belt and hydration, warm baths & Tylenol for comfort. If no relief with this, recommended chiropractor to assess pelvic alignment. Patient upset and tearful. Support provided.   UA collected and sent to lab to r/o UTI as well.   GBS next visit.  Reviewed PTL precautions, kick counts. Has L&D phone # to call if questions/concerns arise over the weekend.

## 2017-03-31 NOTE — MR AVS SNAPSHOT
[facility]  [address]  [place] [address]  Phone: [phone]  Fax: [phone]                  Venessa Hernandez   3/31/2017 2:45 PM   Routine Prenatal    Description:  Female : 1990   Provider:  Consuelo Hood CNM   Department:  [facility]           Reason for Visit     Routine Prenatal Visit                To Do List           Future Appointments        Provider Department Dept Phone    2017 8:15 AM Anneliese Ott CNM [facility] - OB/ -[phone]      Goals (5 Years of Data)     None      [facility]     [facility] On Call Nurse Care Line -  Assistance  Unless otherwise directed by your provider, please contact [facility], our nurse care line that is available for  assistance.     Registered nurses in the [facility] On Call Center provide: appointment scheduling, clinical advisement, health education, and other advisory services.  Call: [phone] (toll free)               Medications           Message regarding Medications     Verify the changes and/or additions to your medication regime listed below are the same as discussed with your clinician today.  If any of these changes or additions are incorrect, please notify your healthcare provider.             Verify that the below list of medications is an accurate representation of the medications you are currently taking.  If none reported, the list may be blank. If incorrect, please contact your healthcare provider. Carry this list with you in case of emergency.           Current Medications     acetaminophen (TYLENOL) 325 MG tablet Take 325 mg by mouth every 6 (six) hours as needed for Pain.    PNV62/FA/OM3/DHA/EPA/FISH OIL (PRENATAL GUMMY ORAL) Take by mouth.           Clinical Reference Information           Prenatal Vitals     Enc. Date GA Prenatal Vitals Prenatal Pulse Pain Level Urine Albumin/Glucose Edema Presentation Dilation/Effacement/Station    3/31/17 33w0d 110/72 / 74.2 kg (163 lb 9.3  "oz)  / 150 / Present  9 Trace / Negative       3/15/17 30w5d 120/66 / 73.2 kg (161 lb 6 oz) 30 cm / 132 / Present    None / None / None      3/8/17 29w5d 106/60 / 71.2 kg (156 lb 15.5 oz) 30 cm / 150 / Present    None / None / None      2/22/17 27w5d 106/68 / 71 kg (156 lb 8.4 oz) 28 cm / 150 / Present    None / None / None      2/20/17 27w3d Admission Dept: [facility] L&D    2/1/17 24w5d 118/60 / 68.4 kg (150 lb 12.7 oz) 25 cm / 151 / Present   Trace / Negative None / None / None      12/28/16 19w5d 118/60 / 64 kg (141 lb 1.5 oz) 20 cm / us / Present    None / None / None      11/30/16 15w5d 118/62 / 62 kg (136 lb 11 oz) 16 cm / 152 / Present    None / None / None      10/14/16 9w0d 120/60 / 133 kg (293 lb 3.4 oz)  / us 180 / Absent    None / None / None  0       Height: 5' 1" (1.549 m)       Your Vitals Were     BP Weight Last Period BMI       110/72 74.2 kg (163 lb 9.3 oz) 08/12/2016 30.91 kg/m2       Allergies as of 3/31/2017     No Known Allergies      Immunizations Administered on Date of Encounter - 3/31/2017     None      Language Assistance Services     ATTENTION: Language assistance services are available, free of charge. Please call [phone].      ATENCIÓN: Si lydiala andrewsañol, tiene a cordova disposición servicios gratuitos de asistencia lingüística. Llame al [phone].     CHÚ Ý: N?u b?n nói Ti?ng Vi?t, có các d?ch v? h? tr? ngôn ng? mi?n phí dành cho b?n. G?i s? [phone].         [facility] complies with applicable Federal civil rights laws and does not discriminate on the basis of race, color, national origin, age, disability, or sex.        "

## 2017-04-05 ENCOUNTER — TELEPHONE (OUTPATIENT)
Dept: OBSTETRICS AND GYNECOLOGY | Facility: CLINIC | Age: 27
End: 2017-04-05

## 2017-04-05 ENCOUNTER — PATIENT MESSAGE (OUTPATIENT)
Dept: OBSTETRICS AND GYNECOLOGY | Facility: CLINIC | Age: 27
End: 2017-04-05

## 2017-04-05 NOTE — TELEPHONE ENCOUNTER
Pt educated on precautions for stomach pains, pt advised to head to LND if needed for eval, pt verbalizes understanding//an

## 2017-04-05 NOTE — TELEPHONE ENCOUNTER
----- Message from Paty Holliday sent at 4/5/2017  8:50 AM CDT -----  Contact: Pt   Pt called and stated she needed to speak to the nurse. She stated she is still having a lot of pain in her stomach and is requesting to be squeezed into the schedule  today. She can be reached at [phone] ([email or web address]).    Thanks,  TF

## 2017-04-12 ENCOUNTER — LAB VISIT (OUTPATIENT)
Dept: LAB | Facility: HOSPITAL | Age: 27
End: 2017-04-12
Attending: ADVANCED PRACTICE MIDWIFE
Payer: OTHER GOVERNMENT

## 2017-04-12 ENCOUNTER — ROUTINE PRENATAL (OUTPATIENT)
Dept: OBSTETRICS AND GYNECOLOGY | Facility: CLINIC | Age: 27
End: 2017-04-12
Payer: OTHER GOVERNMENT

## 2017-04-12 VITALS
DIASTOLIC BLOOD PRESSURE: 74 MMHG | BODY MASS INDEX: 32.32 KG/M2 | SYSTOLIC BLOOD PRESSURE: 104 MMHG | WEIGHT: 171.06 LBS

## 2017-04-12 DIAGNOSIS — Z34.80 SUPERVISION OF OTHER NORMAL PREGNANCY: Primary | ICD-10-CM

## 2017-04-12 DIAGNOSIS — Z34.80 SUPERVISION OF OTHER NORMAL PREGNANCY: ICD-10-CM

## 2017-04-12 LAB
BASOPHILS # BLD AUTO: 0.02 K/UL
BASOPHILS NFR BLD: 0.2 %
DIFFERENTIAL METHOD: ABNORMAL
EOSINOPHIL # BLD AUTO: 0.2 K/UL
EOSINOPHIL NFR BLD: 1.5 %
ERYTHROCYTE [DISTWIDTH] IN BLOOD BY AUTOMATED COUNT: 14.1 %
HCT VFR BLD AUTO: 29.8 %
HGB BLD-MCNC: 9.7 G/DL
LYMPHOCYTES # BLD AUTO: 2.2 K/UL
LYMPHOCYTES NFR BLD: 18.4 %
MCH RBC QN AUTO: 28.4 PG
MCHC RBC AUTO-ENTMCNC: 32.6 %
MCV RBC AUTO: 87 FL
MONOCYTES # BLD AUTO: 0.6 K/UL
MONOCYTES NFR BLD: 5.1 %
NEUTROPHILS # BLD AUTO: 8.8 K/UL
NEUTROPHILS NFR BLD: 74.8 %
PLATELET # BLD AUTO: 323 K/UL
PMV BLD AUTO: 8.8 FL
RBC # BLD AUTO: 3.42 M/UL
WBC # BLD AUTO: 11.88 K/UL

## 2017-04-12 PROCEDURE — 85025 COMPLETE CBC W/AUTO DIFF WBC: CPT

## 2017-04-12 PROCEDURE — 36415 COLL VENOUS BLD VENIPUNCTURE: CPT | Mod: PO

## 2017-04-12 PROCEDURE — 0502F SUBSEQUENT PRENATAL CARE: CPT | Mod: ,,, | Performed by: ADVANCED PRACTICE MIDWIFE

## 2017-04-12 PROCEDURE — 99212 OFFICE O/P EST SF 10 MIN: CPT | Mod: PBBFAC,PO | Performed by: ADVANCED PRACTICE MIDWIFE

## 2017-04-12 PROCEDURE — 99999 PR PBB SHADOW E&M-EST. PATIENT-LVL II: CPT | Mod: PBBFAC,,, | Performed by: ADVANCED PRACTICE MIDWIFE

## 2017-04-12 NOTE — PROGRESS NOTES
Anemia- eating ice- advised= Rpt CBC today  GBS next visit  Doing well    Coffective Motivation Document discussed with mother. Reinforced benefits of breastfeeding, risk of formula, and basic principles for skin to skin, rooming in, cue based feedings and importance of effective latch. Encouraged mother to download Coffective mobile nicci if she has not already done so. Mother verbalizes understanding.

## 2017-04-12 NOTE — MR AVS SNAPSHOT
[facility] - OB/ GYN  [address] [facility] [place] [address]  Phone: [phone]  Fax: [phone]                  Venessa Hernandez   2017 8:15 AM   Routine Prenatal    Description:  Female : 1990   Provider:  Anneliese Ott CNM   Department:  [facility] - OB/ GYN           Reason for Visit     Routine Prenatal Visit           Diagnoses this Visit        Comments    Supervision of other normal pregnancy    -  Primary            To Do List           Future Appointments        Provider Department Dept Phone    2017 12:10 PM LABORATORY, [facility] [phone]    2017 [facility] [phone]    5/3/2017 9:00 [facility] [phone]    5/10/2017 10:00 [facility] [phone]    2017 9:00 [facility] [phone]      Goals (5 Years of Data)     None      [facility]     [facility] -  Assistance  Unless otherwise directed by your provider, please contact [facility], our nurse care line that is available for  assistance.     Registered nurses in the [facility] provide: appointment scheduling, clinical advisement, health education, and other advisory services.  Call: [phone] (toll free)               Medications           Message regarding Medications     Verify the changes and/or additions to your medication regime listed below are the same as discussed with your clinician today.  If any of these changes or additions are incorrect, please notify your healthcare provider.             Verify that the below list of medications is an accurate representation of the medications you are currently taking.  If none reported, the list may be blank. If incorrect, please contact your healthcare provider. Carry this list with you in case of emergency.           Current Medications     acetaminophen  "(TYLENOL) 325 MG tablet Take 325 mg by mouth every 6 (six) hours as needed for Pain.    PNV62/FA/OM3/DHA/EPA/FISH OIL (PRENATAL GUMMY ORAL) Take by mouth.           Clinical Reference Information           Prenatal Vitals     Enc. Date GA Prenatal Vitals Prenatal Pulse Pain Level Urine Albumin/Glucose Edema Presentation Dilation/Effacement/Station    4/12/17 34w5d 104/74 / 77.6 kg (171 lb 1.2 oz) 35 cm / 134 / Present    +1 / +1 / None      3/31/17 33w0d 110/72 / 74.2 kg (163 lb 9.3 oz)  / 150 / Present  9 Trace / Negative None / None / None      3/15/17 30w5d 120/66 / 73.2 kg (161 lb 6 oz) 30 cm / 132 / Present    None / None / None      3/8/17 29w5d 106/60 / 71.2 kg (156 lb 15.5 oz) 30 cm / 150 / Present    None / None / None      2/22/17 27w5d 106/68 / 71 kg (156 lb 8.4 oz) 28 cm / 150 / Present    None / None / None      2/20/17 27w3d Admission Dept: [facility] L&D    2/1/17 24w5d 118/60 / 68.4 kg (150 lb 12.7 oz) 25 cm / 151 / Present   Trace / Negative None / None / None      12/28/16 19w5d 118/60 / 64 kg (141 lb 1.5 oz) 20 cm / us / Present    None / None / None      11/30/16 15w5d 118/62 / 62 kg (136 lb 11 oz) 16 cm / 152 / Present    None / None / None      10/14/16 9w0d 120/60 / 133 kg (293 lb 3.4 oz)  / us 180 / Absent    None / None / None  0       Height: 5' 1" (1.549 m)       Your Vitals Were     BP Weight Last Period BMI       104/74 77.6 kg (171 lb 1.2 oz) 08/12/2016 32.32 kg/m2       Allergies as of 4/12/2017     No Known Allergies      Immunizations Administered on Date of Encounter - 4/12/2017     None      Orders Placed During Today's Visit     Future Labs/Procedures Expected by Expires    CBC auto differential  4/12/2017 6/11/2018      Instructions      Recognizing Labor    The beginning of labor is the beginning of birth. Youll start to feel strong contractions. Thats when the muscles of your uterus tighten up to help push your baby out during birth.  Yes, labor has probably started   Signs of " labor include:  · Your contractions are getting stronger and more painful instead of weaker. Youll probably feel them throughout your whole uterus.  · Your contractions are regular. This means that you feel them about every 5 to 10 minutes. And they are getting closer together.  · You have pink-colored or blood-streaked fluid from your vagina.  · Your water breaks. It may be a gush or a slow trickle of clear fluid from your vagina.  No, its probably not real labor   Signs of false labor include:  · Your contractions arent regular or strong.  · You feel the contractions only in your lower uterus.  · Your contractions go away when you walk or change position.  · Your contractions go away after drinking fluids.  When to call your healthcare provider  Call your healthcare provider or clinic right away if you notice any of these signs:  · Fluid from your vagina, with or without contractions.  · Bleeding heavy enough that you need a sanitary pad.  · You dont feel your baby moving as much as before.     NOTE: Contractions are timed by both of these measures:  · The length of each contraction from its start to its finish.  · How far apart the contractions are -- the time between the start of one contraction and the start of the next contraction.   Date Last Reviewed: 8/9/2015  © 6923-8378 [employer]. [address] [place] [address], [place] [address]. All rights reserved. This information is not intended as a substitute for professional medical care. Always follow your healthcare professional's instructions.        Labor: Preparing for the Hospital    During the final weeks of your pregnancy, you may have irregular contractions. You may feel a drop in your babys position. And the profile of your stomach may look a little different. Because due dates are not exact, have your bag packed and ready for the hospital.  Packing for the hospital  Here are some items you may want to have ready for the hospital:  · A watch  or clock with a second hand  · Insurance card and identification  · Nursing bra, nursing pads, and maternity underwear  · Toiletries  · Something to entertain yourself, like books or a handheld computer  · Heavy socks or slippers and a robe  · Clips for your hair, a brush, and lip balm  · An [employer] or other music player  · A camera or video camera and   · Important phone numbers or email addresses  · Going-home outfits for you and your baby  · A car seat to take your baby home in  Leaving for the hospital  Follow the instructions youve been given on when to leave for the hospital. You may be told to call your healthcare provider when it becomes hard to walk or talk during contractions or if your amniotic sac breaks. If your partner makes the phone call for you, be nearby. That way, your healthcare provider can speak to you directly. Many women are told to go to the hospital after an hour of contractions that come 3 to 5 minutes apart. Leave sooner if the hospital is not nearby or is hard to get to.  Date Last Reviewed: 8/16/2015 © 2000-2016 [employer]. [address] [place] [address]. All rights reserved. This information is not intended as a substitute for professional medical care. Always follow your healthcare professional's instructions.             Language Assistance Services     ATTENTION: Language assistance services are available, free of charge. Please call [phone].      ATENCIÓN: Si habla español, tiene a cordova disposición servicios gratuitos de asistencia lingüística. Llame al [phone].     CHÚ Ý: N?u b?n nói Ti?ng Vi?t, có các d?ch v? h? tr? ngôn ng? mi?n phí dành cho b?n. G?i s? [phone].         [facility] - [facility]/ [facility] complies with applicable Federal civil rights laws and does not discriminate on the basis of race, color, national origin, age, disability, or sex.

## 2017-04-12 NOTE — PATIENT INSTRUCTIONS
Recognizing Labor    The beginning of labor is the beginning of birth. Youll start to feel strong contractions. Thats when the muscles of your uterus tighten up to help push your baby out during birth.  Yes, labor has probably started   Signs of labor include:  · Your contractions are getting stronger and more painful instead of weaker. Youll probably feel them throughout your whole uterus.  · Your contractions are regular. This means that you feel them about every 5 to 10 minutes. And they are getting closer together.  · You have pink-colored or blood-streaked fluid from your vagina.  · Your water breaks. It may be a gush or a slow trickle of clear fluid from your vagina.  No, its probably not real labor   Signs of false labor include:  · Your contractions arent regular or strong.  · You feel the contractions only in your lower uterus.  · Your contractions go away when you walk or change position.  · Your contractions go away after drinking fluids.  When to call your healthcare provider  Call your healthcare provider or clinic right away if you notice any of these signs:  · Fluid from your vagina, with or without contractions.  · Bleeding heavy enough that you need a sanitary pad.  · You dont feel your baby moving as much as before.     NOTE: Contractions are timed by both of these measures:  · The length of each contraction from its start to its finish.  · How far apart the contractions are -- the time between the start of one contraction and the start of the next contraction.   Date Last Reviewed: 8/9/2015  © 7742-6021 [employer]. [address] [place] [address], [place] [address]. All rights reserved. This information is not intended as a substitute for professional medical care. Always follow your healthcare professional's instructions.        Labor: Preparing for the Hospital    During the final weeks of your pregnancy, you may have irregular contractions. You may feel a drop in your babys  position. And the profile of your stomach may look a little different. Because due dates are not exact, have your bag packed and ready for the hospital.  Packing for the hospital  Here are some items you may want to have ready for the hospital:  · A watch or clock with a second hand  · Insurance card and identification  · Nursing bra, nursing pads, and maternity underwear  · Toiletries  · Something to entertain yourself, like books or a handheld computer  · Heavy socks or slippers and a robe  · Clips for your hair, a brush, and lip balm  · An [employer]3 or other music player  · A camera or video camera and   · Important phone numbers or email addresses  · Going-home outfits for you and your baby  · A car seat to take your baby home in  Leaving for the hospital  Follow the instructions youve been given on when to leave for the hospital. You may be told to call your healthcare provider when it becomes hard to walk or talk during contractions or if your amniotic sac breaks. If your partner makes the phone call for you, be nearby. That way, your healthcare provider can speak to you directly. Many women are told to go to the hospital after an hour of contractions that come 3 to 5 minutes apart. Leave sooner if the hospital is not nearby or is hard to get to.  Date Last Reviewed: 8/16/2015 © 2000-2016 [employer]. [address] [place] [address], [place] [address]. All rights reserved. This information is not intended as a substitute for professional medical care. Always follow your healthcare professional's instructions.

## 2017-04-26 ENCOUNTER — ROUTINE PRENATAL (OUTPATIENT)
Dept: OBSTETRICS AND GYNECOLOGY | Facility: CLINIC | Age: 27
End: 2017-04-26
Payer: OTHER GOVERNMENT

## 2017-04-26 VITALS
SYSTOLIC BLOOD PRESSURE: 122 MMHG | BODY MASS INDEX: 32.49 KG/M2 | DIASTOLIC BLOOD PRESSURE: 78 MMHG | WEIGHT: 171.94 LBS

## 2017-04-26 DIAGNOSIS — Z34.80 SUPERVISION OF OTHER NORMAL PREGNANCY: Primary | ICD-10-CM

## 2017-04-26 PROCEDURE — 0502F SUBSEQUENT PRENATAL CARE: CPT | Mod: ,,, | Performed by: ADVANCED PRACTICE MIDWIFE

## 2017-04-26 PROCEDURE — 99999 PR PBB SHADOW E&M-EST. PATIENT-LVL II: CPT | Mod: PBBFAC,,, | Performed by: ADVANCED PRACTICE MIDWIFE

## 2017-04-26 PROCEDURE — 87081 CULTURE SCREEN ONLY: CPT

## 2017-04-26 PROCEDURE — 99212 OFFICE O/P EST SF 10 MIN: CPT | Mod: PBBFAC,PO | Performed by: ADVANCED PRACTICE MIDWIFE

## 2017-04-26 NOTE — MR AVS SNAPSHOT
[facility]  [address] [facility] [place] [address]  Phone: [phone]  Fax: [phone]                  Venessa Hernandez   2017 2:15 PM   Routine Prenatal    Description:  Female : 1990   Provider:  Anneliese Ott CNM   Department:  [facility] - [facility]/ [facility]           Reason for Visit     Routine Prenatal Visit           Diagnoses this Visit        Comments    Supervision of other normal pregnancy    -  Primary            To Do List           Future Appointments        Provider Department Dept Phone    5/3/2017 9:00 AM [facility] [phone]    5/10/2017 10:00 AM Anneliese Ott CNM [facility] OB/ [facility] [phone]    2017 9:00 AM Anneliese Ott CNM [facility] OB/ -[phone]      Goals (5 Years of Data)     None      [facility]     [facility] -  Assistance  Unless otherwise directed by your provider, please contact [facility], our nurse care line that is available for  assistance.     Registered nurses in the [facility] provide: appointment scheduling, clinical advisement, health education, and other advisory services.  Call: [phone] (toll free)               Medications           Message regarding Medications     Verify the changes and/or additions to your medication regime listed below are the same as discussed with your clinician today.  If any of these changes or additions are incorrect, please notify your healthcare provider.             Verify that the below list of medications is an accurate representation of the medications you are currently taking.  If none reported, the list may be blank. If incorrect, please contact your healthcare provider. Carry this list with you in case of emergency.           Current Medications     acetaminophen (TYLENOL) 325 MG tablet Take 325 mg by mouth every 6 (six) hours as needed for Pain.    PNV62/FA/OM3/DHA/EPA/FISH OIL (PRENATAL GUMMY ORAL) Take by mouth.     "       Clinical Reference Information           Prenatal Vitals     Enc. Date GA Prenatal Vitals Prenatal Pulse Pain Level Urine Albumin/Glucose Edema Presentation Dilation/Effacement/Station    4/26/17 36w5d 122/78 / 78 kg (171 lb 15.3 oz) 37 cm / 132 / Present  0  +1 / +1 / None  0 / 0 / -5    4/12/17 34w5d 104/74 / 77.6 kg (171 lb 1.2 oz) 35 cm / 134 / Present    +1 / +1 / None      3/31/17 33w0d 110/72 / 74.2 kg (163 lb 9.3 oz)  / 150 / Present  9 Trace / Negative None / None / None      3/15/17 30w5d 120/66 / 73.2 kg (161 lb 6 oz) 30 cm / 132 / Present    None / None / None      3/8/17 29w5d 106/60 / 71.2 kg (156 lb 15.5 oz) 30 cm / 150 / Present    None / None / None      2/22/17 27w5d 106/68 / 71 kg (156 lb 8.4 oz) 28 cm / 150 / Present    None / None / None      2/20/17 27w3d Admission Dept: [facility] L&D    2/1/17 24w5d 118/60 / 68.4 kg (150 lb 12.7 oz) 25 cm / 151 / Present   Trace / Negative None / None / None      12/28/16 19w5d 118/60 / 64 kg (141 lb 1.5 oz) 20 cm / us / Present    None / None / None      11/30/16 15w5d 118/62 / 62 kg (136 lb 11 oz) 16 cm / 152 / Present    None / None / None      10/14/16 9w0d 120/60 / 133 kg (293 lb 3.4 oz)  / us 180 / Absent    None / None / None  0       Height: 5' 1" (1.549 m)       Your Vitals Were     BP Weight Last Period BMI       122/78 78 kg (171 lb 15.3 oz) 08/12/2016 32.49 kg/m2       Allergies as of 4/26/2017     No Known Allergies      Immunizations Administered on Date of Encounter - 4/26/2017     None      Orders Placed During Today's Visit      Normal Orders This Visit    Strep B Screen, Vaginal / Rectal       Instructions      Breech Presentation  With breech presentation, your baby is in a buttocks--or feet-first position. Babies are usually in a head-first position. A breech presentation can make it hard for the babys head to fit through the birth canal during delivery. This can cause lack of oxygen or nerve damage in your baby.  Checking for breech " presentation  Your healthcare provider can tell that your baby is in a breech presentation by gently pressing on your belly. If after about 35 weeks your baby still isnt in a head-first position, you may have a test called ultrasound. This test uses sound waves to form an image of your baby on a screen.  Types of breech presentations  As you near your due date, your baby may be in one of the following three breech presentations:    Frandy breech  The babys buttocks point down toward the birth canal. The legs extend up toward the head.    Complete breech  The baby sits cross-legged. The buttocks point down and the knees are bent. The feet are tucked under the legs.    Footling breech  One or both of the babys feet or legs are stretched down into the birth canal. The buttocks are also pointing downward.  Delivering your baby  Even if the babys position cant be changed, a breech baby can sometimes be born vaginally, although this is rare. Your healthcare provider will discuss the risks with you. More often, a  section (surgical delivery) is done. You will have anesthesia (medicine to block pain). But you may remain awake and alert.  Once you deliver  Whether you give birth vaginally or by  section, you and your baby will most likely be fine. Just because your baby is in a breech position doesnt mean that he or she will have health problems.  Can you have a vaginal delivery?  In some cases, your healthcare provider may try to turn the baby head-down by applying pressure on your abdomen. This technique is called an external cephalic version. If this works, you might be able to have a vaginal delivery. Your healthcare provider will discuss this with you.   Date Last Reviewed: 2016-2016 [employer]. [address] [place] [address], [place] [address]. All rights reserved. This information is not intended as a substitute for professional medical care. Always follow your healthcare  professional's instructions.        External Version  If your baby doesnt move into a head-first position on his or her own, your healthcare provider may attempt to do an external version. Your healthcare provider will try to rotate your baby by pressing down on your belly. Your healthcare provider may give you medicine to relax your uterus. This can make it easier for him or her to rotate your baby. During a version, your healthcare provider will use ultrasound to watch your baby.  Changing your babys position    1  · Your healthcare provider presses down on your belly and locates your babys head and bottom.    2  · By pressing down on your belly, your healthcare provider may be able to rotate the baby into a head-first position. This often will allow a vaginal birth.  Delivering your baby  Even if your babys position cant be changed, he or she can sometimes be born vaginally. The mode of delivery should depend on the experience of your healthcare provider. A  section (surgical delivery) is the preferred mode of delivery for most healthcare providers, because of the diminishing expertise in vaginal breech delivery. You will have anesthesia (medicine to block pain), but you may remain awake and alert.   Date Last Reviewed: 2015  © 0906-3984 [employer]. [address] [place] [address], [place] [address]. All rights reserved. This information is not intended as a substitute for professional medical care. Always follow your healthcare professional's instructions.             Language Assistance Services     ATTENTION: Language assistance services are available, free of charge. Please call [phone].      ATENCIÓN: Si habla español, tiene a cordova disposición servicios gratuitos de asistencia lingüística. Llame al [phone].     [facility] Ý: N?u b?n nói Ti?ng Vi?t, có các d?ch v? h? tr? ngôn ng? mi?n phí dành cho b?n. G?i s? [phone].         [facility] - [facility] complies with applicable Federal  civil rights laws and does not discriminate on the basis of race, color, national origin, age, disability, or sex.

## 2017-04-26 NOTE — PATIENT INSTRUCTIONS
Breech Presentation  With breech presentation, your baby is in a buttocks--or feet-first position. Babies are usually in a head-first position. A breech presentation can make it hard for the babys head to fit through the birth canal during delivery. This can cause lack of oxygen or nerve damage in your baby.  Checking for breech presentation  Your healthcare provider can tell that your baby is in a breech presentation by gently pressing on your belly. If after about 35 weeks your baby still isnt in a head-first position, you may have a test called ultrasound. This test uses sound waves to form an image of your baby on a screen.  Types of breech presentations  As you near your due date, your baby may be in one of the following three breech presentations:    Frandy breech  The babys buttocks point down toward the birth canal. The legs extend up toward the head.    Complete breech  The baby sits cross-legged. The buttocks point down and the knees are bent. The feet are tucked under the legs.    Footling breech  One or both of the babys feet or legs are stretched down into the birth canal. The buttocks are also pointing downward.  Delivering your baby  Even if the babys position cant be changed, a breech baby can sometimes be born vaginally, although this is rare. Your healthcare provider will discuss the risks with you. More often, a  section (surgical delivery) is done. You will have anesthesia (medicine to block pain). But you may remain awake and alert.  Once you deliver  Whether you give birth vaginally or by  section, you and your baby will most likely be fine. Just because your baby is in a breech position doesnt mean that he or she will have health problems.  Can you have a vaginal delivery?  In some cases, your healthcare provider may try to turn the baby head-down by applying pressure on your abdomen. This technique is called an external cephalic version. If this works, you might be  able to have a vaginal delivery. Your healthcare provider will discuss this with you.   Date Last Reviewed: 2016  © 2321-2996 [employer]. [address] [place] [address]. All rights reserved. This information is not intended as a substitute for professional medical care. Always follow your healthcare professional's instructions.        External Version  If your baby doesnt move into a head-first position on his or her own, your healthcare provider may attempt to do an external version. Your healthcare provider will try to rotate your baby by pressing down on your belly. Your healthcare provider may give you medicine to relax your uterus. This can make it easier for him or her to rotate your baby. During a version, your healthcare provider will use ultrasound to watch your baby.  Changing your babys position    1  · Your healthcare provider presses down on your belly and locates your babys head and bottom.    2  · By pressing down on your belly, your healthcare provider may be able to rotate the baby into a head-first position. This often will allow a vaginal birth.  Delivering your baby  Even if your babys position cant be changed, he or she can sometimes be born vaginally. The mode of delivery should depend on the experience of your healthcare provider. A  section (surgical delivery) is the preferred mode of delivery for most healthcare providers, because of the diminishing expertise in vaginal breech delivery. You will have anesthesia (medicine to block pain), but you may remain awake and alert.   Date Last Reviewed: 2015  © 5927-9063 [employer]. [address] [place] [address]. All rights reserved. This information is not intended as a substitute for professional medical care. Always follow your healthcare professional's instructions.

## 2017-04-26 NOTE — PROGRESS NOTES
GBS today  Leopolds- Obvious breech  O/E- No PP in pelvis    Coffective counseling sheet Protect Breastfeeding discussed with mother. Reinforced avoidence of artifical nipples and formula, unless medically indicated.  Encouraged mother to download [employer] mobile nicci if she has not already done so. Mother verbalizes understanding.

## 2017-04-28 LAB — BACTERIA SPEC AEROBE CULT: NORMAL

## 2017-05-03 ENCOUNTER — PROCEDURE VISIT (OUTPATIENT)
Dept: OBSTETRICS AND GYNECOLOGY | Facility: CLINIC | Age: 27
End: 2017-05-03
Payer: OTHER GOVERNMENT

## 2017-05-03 VITALS
WEIGHT: 173.31 LBS | DIASTOLIC BLOOD PRESSURE: 66 MMHG | BODY MASS INDEX: 32.74 KG/M2 | SYSTOLIC BLOOD PRESSURE: 102 MMHG

## 2017-05-03 DIAGNOSIS — O40.3XX0 POLYHYDRAMNIOS AFFECTING PREGNANCY IN THIRD TRIMESTER: ICD-10-CM

## 2017-05-03 DIAGNOSIS — Z34.80 SUPERVISION OF OTHER NORMAL PREGNANCY: Primary | ICD-10-CM

## 2017-05-03 DIAGNOSIS — Z34.80 SUPERVISION OF OTHER NORMAL PREGNANCY: ICD-10-CM

## 2017-05-03 PROCEDURE — 0502F SUBSEQUENT PRENATAL CARE: CPT | Mod: ,,, | Performed by: ADVANCED PRACTICE MIDWIFE

## 2017-05-03 PROCEDURE — 76819 FETAL BIOPHYS PROFIL W/O NST: CPT | Mod: PBBFAC,PO | Performed by: OBSTETRICS & GYNECOLOGY

## 2017-05-03 PROCEDURE — 76819 FETAL BIOPHYS PROFIL W/O NST: CPT | Mod: 26,S$PBB,, | Performed by: OBSTETRICS & GYNECOLOGY

## 2017-05-03 PROCEDURE — 99212 OFFICE O/P EST SF 10 MIN: CPT | Mod: PBBFAC,PO | Performed by: ADVANCED PRACTICE MIDWIFE

## 2017-05-03 PROCEDURE — 99999 PR PBB SHADOW E&M-EST. PATIENT-LVL II: CPT | Mod: PBBFAC,,, | Performed by: ADVANCED PRACTICE MIDWIFE

## 2017-05-03 NOTE — MR AVS SNAPSHOT
[facility]  [address] [place] [address]  Phone: [phone]  Fax: [phone]                  Venessa Hernandez   5/3/2017 9:00 AM   Routine Prenatal    Description:  Female : 1990   Provider:  Anneliese Ott CNM   Department:  [facility]           Diagnoses this Visit        Comments    Supervision of other normal pregnancy    -  Primary            To Do List           Future Appointments        Provider Department Dept Phone    5/3/2017 9:30 AM ULTRASOUND, OB-GYN [facility] OB/ -[phone]    5/10/2017 10:00 AM Anneliese Ott CNM [facility] OB/ -[phone]    2017 9:00 AM Anneliese Ott CNM [facility] OB/ -[phone]      Goals (5 Years of Data)     None      [facility]     [facility] Care [facility] -  Assistance  Unless otherwise directed by your provider, please contact [facility], our nurse care line that is available for  assistance.     Registered nurses in the [facility] provide: appointment scheduling, clinical advisement, health education, and other advisory services.  Call: [phone] (toll free)               Medications           Message regarding Medications     Verify the changes and/or additions to your medication regime listed below are the same as discussed with your clinician today.  If any of these changes or additions are incorrect, please notify your healthcare provider.             Verify that the below list of medications is an accurate representation of the medications you are currently taking.  If none reported, the list may be blank. If incorrect, please contact your healthcare provider. Carry this list with you in case of emergency.           Current Medications     acetaminophen (TYLENOL) 325 MG tablet Take 325 mg by mouth every 6 (six) hours as needed for Pain.    PNV62/FA/OM3/DHA/EPA/FISH OIL (PRENATAL GUMMY ORAL) Take by mouth.           Clinical Reference Information          "  Prenatal Vitals     Enc. Date GA Prenatal Vitals Prenatal Pulse Pain Level Urine Albumin/Glucose Edema Presentation Dilation/Effacement/Station    5/3/17 37w5d 102/66 / 78.6 kg (173 lb 4.5 oz) 37 cm / 140 / Present    +1 / +1 / None  0 / 0 / -4    4/26/17 36w5d 122/78 / 78 kg (171 lb 15.3 oz) 37 cm / 132 / Present  0  +1 / +1 / None  0 / 0 / -5    4/12/17 34w5d 104/74 / 77.6 kg (171 lb 1.2 oz) 35 cm / 134 / Present    +1 / +1 / None      3/31/17 33w0d 110/72 / 74.2 kg (163 lb 9.3 oz)  / 150 / Present  9 Trace / Negative None / None / None      3/15/17 30w5d 120/66 / 73.2 kg (161 lb 6 oz) 30 cm / 132 / Present    None / None / None      3/8/17 29w5d 106/60 / 71.2 kg (156 lb 15.5 oz) 30 cm / 150 / Present    None / None / None      2/22/17 27w5d 106/68 / 71 kg (156 lb 8.4 oz) 28 cm / 150 / Present    None / None / None      2/20/17 27w3d Admission Dept: [facility] L&D    2/1/17 24w5d 118/60 / 68.4 kg (150 lb 12.7 oz) 25 cm / 151 / Present   Trace / Negative None / None / None      12/28/16 19w5d 118/60 / 64 kg (141 lb 1.5 oz) 20 cm / us / Present    None / None / None      11/30/16 15w5d 118/62 / 62 kg (136 lb 11 oz) 16 cm / 152 / Present    None / None / None      10/14/16 9w0d 120/60 / 133 kg (293 lb 3.4 oz)  / us 180 / Absent    None / None / None  0       Height: 5' 1" (1.549 m)       Your Vitals Were     BP Weight Last Period BMI       102/66 78.6 kg (173 lb 4.5 oz) 08/12/2016 32.74 kg/m2       Allergies as of 5/3/2017     No Known Allergies      Immunizations Administered on Date of Encounter - 5/3/2017     None      Orders Placed During Today's Visit      Normal Orders This Visit    US OB/GYN Procedure ([facility])-Today       Language Assistance Services     ATTENTION: Language assistance services are available, free of charge. Please call [phone].      ATENCIÓN: Si habla andrewsañol, tiene a cordova disposición servicios gratuitos de asistencia lingüística. Llame al [phone].     HECTOR Ý: N?u b?n nói Ti?ng Vi?t, " có các d?ch v? h? tr? ngôn ng? mi?n phí dành cho b?n. G?i s? [phone].         [facility] complies with applicable Federal civil rights laws and does not discriminate on the basis of race, color, national origin, age, disability, or sex.

## 2017-05-09 ENCOUNTER — TELEPHONE (OUTPATIENT)
Dept: OBSTETRICS AND GYNECOLOGY | Facility: CLINIC | Age: 27
End: 2017-05-09

## 2017-05-09 NOTE — TELEPHONE ENCOUNTER
----- Message from Marielos Olson sent at 5/9/2017  3:27 PM CDT -----  Contact: [facility]/Michelle  States she needs the order they sent signed for the breast pump. Please call Michelle at [phone] ext [phone]. Thank you

## 2017-05-10 ENCOUNTER — ROUTINE PRENATAL (OUTPATIENT)
Dept: OBSTETRICS AND GYNECOLOGY | Facility: CLINIC | Age: 27
End: 2017-05-10
Payer: OTHER GOVERNMENT

## 2017-05-10 VITALS — SYSTOLIC BLOOD PRESSURE: 120 MMHG | BODY MASS INDEX: 33.16 KG/M2 | WEIGHT: 175.5 LBS | DIASTOLIC BLOOD PRESSURE: 60 MMHG

## 2017-05-10 DIAGNOSIS — O40.3XX0 POLYHYDRAMNIOS AFFECTING PREGNANCY IN THIRD TRIMESTER: Primary | ICD-10-CM

## 2017-05-10 PROCEDURE — 0502F SUBSEQUENT PRENATAL CARE: CPT | Mod: ,,, | Performed by: ADVANCED PRACTICE MIDWIFE

## 2017-05-10 PROCEDURE — 99999 PR PBB SHADOW E&M-EST. PATIENT-LVL II: CPT | Mod: PBBFAC,,, | Performed by: ADVANCED PRACTICE MIDWIFE

## 2017-05-10 PROCEDURE — 99212 OFFICE O/P EST SF 10 MIN: CPT | Mod: PBBFAC,PO | Performed by: ADVANCED PRACTICE MIDWIFE

## 2017-05-10 NOTE — MR AVS SNAPSHOT
[facility]  [address] [place] [address]  Phone: [phone]  Fax: [phone]                  Venessa Hernandez   5/10/2017 10:00 AM   Routine Prenatal    Description:  Female : 1990   Provider:  Anneliese Ott CNM   Department:  [facility]           Reason for Visit     Routine Prenatal Visit           Diagnoses this Visit        Comments    Polyhydramnios affecting pregnancy in third trimester    -  Primary            To Do List           Future Appointments        Provider Department Dept Phone    2017 10:00 AM ULTRASOUND, OB-GYN [facility] OB/ -[phone]    2017 11:15 AM Anneliese Ott CNM [facility] OB/ -[phone]      Goals (5 Years of Data)     None      [facility]     [facility] Care [facility] -  Assistance  Unless otherwise directed by your provider, please contact [facility], our nurse care line that is available for  assistance.     Registered nurses in the [facility] provide: appointment scheduling, clinical advisement, health education, and other advisory services.  Call: [phone] (toll free)               Medications           Message regarding Medications     Verify the changes and/or additions to your medication regime listed below are the same as discussed with your clinician today.  If any of these changes or additions are incorrect, please notify your healthcare provider.             Verify that the below list of medications is an accurate representation of the medications you are currently taking.  If none reported, the list may be blank. If incorrect, please contact your healthcare provider. Carry this list with you in case of emergency.           Current Medications     acetaminophen (TYLENOL) 325 MG tablet Take 325 mg by mouth every 6 (six) hours as needed for Pain.    PNV62/FA/OM3/DHA/EPA/FISH OIL (PRENATAL GUMMY ORAL) Take by mouth.           Clinical Reference Information          "  Prenatal Vitals     Enc. Date GA Prenatal Vitals Prenatal Pulse Pain Level Urine Albumin/Glucose Edema Presentation Dilation/Effacement/Station    5/10/17 38w5d 120/60 / 79.6 kg (175 lb 7.8 oz) 37 cm / 79.6kg / Present    +1 / +1 / +1  0 / 0 / -4    5/3/17 37w5d 102/66 / 78.6 kg (173 lb 4.5 oz) 37 cm / 140 / Present    +1 / +1 / None  0 / 0 / -4    4/26/17 36w5d 122/78 / 78 kg (171 lb 15.3 oz) 37 cm / 132 / Present  0  +1 / +1 / None  0 / 0 / -5    4/12/17 34w5d 104/74 / 77.6 kg (171 lb 1.2 oz) 35 cm / 134 / Present    +1 / +1 / None      3/31/17 33w0d 110/72 / 74.2 kg (163 lb 9.3 oz)  / 150 / Present  9 Trace / Negative None / None / None      3/15/17 30w5d 120/66 / 73.2 kg (161 lb 6 oz) 30 cm / 132 / Present    None / None / None      3/8/17 29w5d 106/60 / 71.2 kg (156 lb 15.5 oz) 30 cm / 150 / Present    None / None / None      2/22/17 27w5d 106/68 / 71 kg (156 lb 8.4 oz) 28 cm / 150 / Present    None / None / None      2/20/17 27w3d Admission Dept: [facility] L&D    2/1/17 24w5d 118/60 / 68.4 kg (150 lb 12.7 oz) 25 cm / 151 / Present   Trace / Negative None / None / None      12/28/16 19w5d 118/60 / 64 kg (141 lb 1.5 oz) 20 cm / us / Present    None / None / None      11/30/16 15w5d 118/62 / 62 kg (136 lb 11 oz) 16 cm / 152 / Present    None / None / None      10/14/16 9w0d 120/60 / 133 kg (293 lb 3.4 oz)  / us 180 / Absent    None / None / None  0       Height: 5' 1" (1.549 m)       Your Vitals Were     BP Weight Last Period BMI       120/60 79.6 kg (175 lb 7.8 oz) 08/12/2016 33.16 kg/m2       Allergies as of 5/10/2017     No Known Allergies      Immunizations Administered on Date of Encounter - 5/10/2017     None      Orders Placed During Today's Visit     Future Labs/Procedures Expected by Expires    US OB/GYN Procedure ([facility])-Future  As directed 5/10/2018      Language Assistance Services     ATTENTION: Language assistance services are available, free of charge. Please call [phone].      ATENCIÓN: Si " habla madelin, tiene a cordova disposición servicios gratuitos de asistencia lingüística. Llame al [phone].     CHÚ Ý: N?u b?n nói Ti?ng Vi?t, có các d?ch v? h? tr? ngôn ng? mi?n phí dành cho b?n. G?i s? [phone].         [facility] complies with applicable Federal civil rights laws and does not discriminate on the basis of race, color, national origin, age, disability, or sex.

## 2017-05-18 ENCOUNTER — ROUTINE PRENATAL (OUTPATIENT)
Dept: OBSTETRICS AND GYNECOLOGY | Facility: CLINIC | Age: 27
End: 2017-05-18
Payer: OTHER GOVERNMENT

## 2017-05-18 ENCOUNTER — PATIENT MESSAGE (OUTPATIENT)
Dept: OBSTETRICS AND GYNECOLOGY | Facility: CLINIC | Age: 27
End: 2017-05-18

## 2017-05-18 ENCOUNTER — ANESTHESIA EVENT (OUTPATIENT)
Dept: OBSTETRICS AND GYNECOLOGY | Facility: HOSPITAL | Age: 27
End: 2017-05-18
Payer: OTHER GOVERNMENT

## 2017-05-18 ENCOUNTER — HOSPITAL ENCOUNTER (INPATIENT)
Facility: HOSPITAL | Age: 27
LOS: 3 days | Discharge: HOME | End: 2017-05-21
Attending: OBSTETRICS & GYNECOLOGY | Admitting: OBSTETRICS & GYNECOLOGY
Payer: OTHER GOVERNMENT

## 2017-05-18 VITALS
DIASTOLIC BLOOD PRESSURE: 78 MMHG | WEIGHT: 177.69 LBS | BODY MASS INDEX: 33.57 KG/M2 | SYSTOLIC BLOOD PRESSURE: 144 MMHG

## 2017-05-18 DIAGNOSIS — R03.0 ELEVATED BP WITHOUT DIAGNOSIS OF HYPERTENSION: ICD-10-CM

## 2017-05-18 DIAGNOSIS — O40.3XX0 POLYHYDRAMNIOS AFFECTING PREGNANCY IN THIRD TRIMESTER: ICD-10-CM

## 2017-05-18 DIAGNOSIS — O36.8390 NON-REASSURING FETAL HEART RATE OR RHYTHM AFFECTING MOTHER: ICD-10-CM

## 2017-05-18 DIAGNOSIS — O36.8390 FETAL HEART RATE DECELERATIONS AFFECTING MANAGEMENT OF MOTHER: ICD-10-CM

## 2017-05-18 DIAGNOSIS — O16.3 ELEVATED BLOOD PRESSURE AFFECTING PREGNANCY IN THIRD TRIMESTER, ANTEPARTUM: ICD-10-CM

## 2017-05-18 DIAGNOSIS — Z34.80 SUPERVISION OF OTHER NORMAL PREGNANCY: Primary | ICD-10-CM

## 2017-05-18 LAB
ABO + RH BLD: NORMAL
ALBUMIN SERPL BCP-MCNC: 2.9 G/DL
ALP SERPL-CCNC: 142 U/L
ALT SERPL W/O P-5'-P-CCNC: 16 U/L
ANION GAP SERPL CALC-SCNC: 9 MMOL/L
AST SERPL-CCNC: 21 U/L
BASOPHILS # BLD AUTO: 0.03 K/UL
BASOPHILS NFR BLD: 0.2 %
BILIRUB SERPL-MCNC: 0.5 MG/DL
BLD GP AB SCN CELLS X3 SERPL QL: NORMAL
BLD GP AB SCN CELLS X3 SERPL QL: NORMAL
BUN SERPL-MCNC: 9 MG/DL
CALCIUM SERPL-MCNC: 9.6 MG/DL
CHLORIDE SERPL-SCNC: 108 MMOL/L
CO2 SERPL-SCNC: 18 MMOL/L
CREAT SERPL-MCNC: 0.7 MG/DL
CREAT UR-MCNC: 44.2 MG/DL
DIFFERENTIAL METHOD: ABNORMAL
EOSINOPHIL # BLD AUTO: 0.1 K/UL
EOSINOPHIL NFR BLD: 0.8 %
ERYTHROCYTE [DISTWIDTH] IN BLOOD BY AUTOMATED COUNT: 14.4 %
EST. GFR  (AFRICAN AMERICAN): >60 ML/MIN/1.73 M^2
EST. GFR  (NON AFRICAN AMERICAN): >60 ML/MIN/1.73 M^2
GLUCOSE SERPL-MCNC: 73 MG/DL
HCT VFR BLD AUTO: 30.3 %
HGB BLD-MCNC: 9.8 G/DL
LYMPHOCYTES # BLD AUTO: 2.6 K/UL
LYMPHOCYTES NFR BLD: 20.8 %
MCH RBC QN AUTO: 26.1 PG
MCHC RBC AUTO-ENTMCNC: 32.3 %
MCV RBC AUTO: 81 FL
MONOCYTES # BLD AUTO: 0.5 K/UL
MONOCYTES NFR BLD: 4.3 %
NEUTROPHILS # BLD AUTO: 9.2 K/UL
NEUTROPHILS NFR BLD: 74.3 %
PLATELET # BLD AUTO: 321 K/UL
PMV BLD AUTO: 9 FL
POTASSIUM SERPL-SCNC: 3.6 MMOL/L
PROT SERPL-MCNC: 7 G/DL
PROT UR-MCNC: 7 MG/DL
PROT/CREAT RATIO, UR: 0.16
RBC # BLD AUTO: 3.76 M/UL
SODIUM SERPL-SCNC: 135 MMOL/L
WBC # BLD AUTO: 12.47 K/UL

## 2017-05-18 PROCEDURE — 62326 NJX INTERLAMINAR LMBR/SAC: CPT | Performed by: ANESTHESIOLOGY

## 2017-05-18 PROCEDURE — 59025 FETAL NON-STRESS TEST: CPT

## 2017-05-18 PROCEDURE — 25000003 PHARM REV CODE 250: Performed by: NURSE ANESTHETIST, CERTIFIED REGISTERED

## 2017-05-18 PROCEDURE — 0502F SUBSEQUENT PRENATAL CARE: CPT | Mod: ,,, | Performed by: ADVANCED PRACTICE MIDWIFE

## 2017-05-18 PROCEDURE — 85025 COMPLETE CBC W/AUTO DIFF WBC: CPT

## 2017-05-18 PROCEDURE — 76819 FETAL BIOPHYS PROFIL W/O NST: CPT | Mod: 26,S$PBB,, | Performed by: OBSTETRICS & GYNECOLOGY

## 2017-05-18 PROCEDURE — 99211 OFF/OP EST MAY X REQ PHY/QHP: CPT | Mod: 25,27

## 2017-05-18 PROCEDURE — 86901 BLOOD TYPING SEROLOGIC RH(D): CPT

## 2017-05-18 PROCEDURE — 11000001 HC ACUTE MED/SURG PRIVATE ROOM

## 2017-05-18 PROCEDURE — 25000003 PHARM REV CODE 250: Performed by: ADVANCED PRACTICE MIDWIFE

## 2017-05-18 PROCEDURE — 63600175 PHARM REV CODE 636 W HCPCS: Performed by: ADVANCED PRACTICE MIDWIFE

## 2017-05-18 PROCEDURE — 86900 BLOOD TYPING SEROLOGIC ABO: CPT

## 2017-05-18 PROCEDURE — 86850 RBC ANTIBODY SCREEN: CPT | Mod: 91

## 2017-05-18 PROCEDURE — 80053 COMPREHEN METABOLIC PANEL: CPT

## 2017-05-18 PROCEDURE — 27800517 HC TRAY,EPIDURAL-CONTINUOUS: Performed by: NURSE ANESTHETIST, CERTIFIED REGISTERED

## 2017-05-18 PROCEDURE — 84156 ASSAY OF PROTEIN URINE: CPT

## 2017-05-18 PROCEDURE — 99999 PR PBB SHADOW E&M-EST. PATIENT-LVL II: CPT | Mod: PBBFAC,,, | Performed by: ADVANCED PRACTICE MIDWIFE

## 2017-05-18 PROCEDURE — 63600175 PHARM REV CODE 636 W HCPCS: Performed by: NURSE ANESTHETIST, CERTIFIED REGISTERED

## 2017-05-18 PROCEDURE — 72100002 HC LABOR CARE, 1ST 8 HOURS

## 2017-05-18 PROCEDURE — 3E033VJ INTRODUCTION OF OTHER HORMONE INTO PERIPHERAL VEIN, PERCUTANEOUS APPROACH: ICD-10-PCS | Performed by: ADVANCED PRACTICE MIDWIFE

## 2017-05-18 RX ORDER — KETOROLAC TROMETHAMINE 30 MG/ML
30 INJECTION, SOLUTION INTRAMUSCULAR; INTRAVENOUS EVERY 6 HOURS
Status: DISCONTINUED | OUTPATIENT
Start: 2017-05-18 | End: 2017-05-19

## 2017-05-18 RX ORDER — SODIUM CHLORIDE, SODIUM LACTATE, POTASSIUM CHLORIDE, CALCIUM CHLORIDE 600; 310; 30; 20 MG/100ML; MG/100ML; MG/100ML; MG/100ML
INJECTION, SOLUTION INTRAVENOUS CONTINUOUS
Status: DISCONTINUED | OUTPATIENT
Start: 2017-05-18 | End: 2017-05-19

## 2017-05-18 RX ORDER — CEFAZOLIN SODIUM 1 G/50ML
2 SOLUTION INTRAVENOUS
Status: DISCONTINUED | OUTPATIENT
Start: 2017-05-18 | End: 2017-05-19

## 2017-05-18 RX ORDER — ROPIVACAINE HYDROCHLORIDE 2 MG/ML
INJECTION, SOLUTION EPIDURAL; INFILTRATION; PERINEURAL CONTINUOUS PRN
Status: DISCONTINUED | OUTPATIENT
Start: 2017-05-18 | End: 2017-05-19

## 2017-05-18 RX ORDER — ONDANSETRON 8 MG/1
8 TABLET, ORALLY DISINTEGRATING ORAL EVERY 8 HOURS PRN
Status: DISCONTINUED | OUTPATIENT
Start: 2017-05-18 | End: 2017-05-18

## 2017-05-18 RX ORDER — BUTORPHANOL TARTRATE 1 MG/ML
1 INJECTION INTRAMUSCULAR; INTRAVENOUS ONCE
Status: COMPLETED | OUTPATIENT
Start: 2017-05-18 | End: 2017-05-18

## 2017-05-18 RX ORDER — OXYTOCIN/RINGER'S LACTATE 20/1000 ML
2 PLASTIC BAG, INJECTION (ML) INTRAVENOUS CONTINUOUS
Status: DISCONTINUED | OUTPATIENT
Start: 2017-05-18 | End: 2017-05-19

## 2017-05-18 RX ORDER — ONDANSETRON 8 MG/1
8 TABLET, ORALLY DISINTEGRATING ORAL EVERY 8 HOURS PRN
Status: DISCONTINUED | OUTPATIENT
Start: 2017-05-18 | End: 2017-05-19

## 2017-05-18 RX ORDER — ACETAMINOPHEN 500 MG
500 TABLET ORAL EVERY 6 HOURS PRN
Status: DISCONTINUED | OUTPATIENT
Start: 2017-05-18 | End: 2017-05-19

## 2017-05-18 RX ORDER — LIDOCAINE HYDROCHLORIDE AND EPINEPHRINE 15; 5 MG/ML; UG/ML
INJECTION, SOLUTION EPIDURAL
Status: DISCONTINUED | OUTPATIENT
Start: 2017-05-18 | End: 2017-05-19

## 2017-05-18 RX ORDER — ROPIVACAINE HYDROCHLORIDE 2 MG/ML
INJECTION, SOLUTION EPIDURAL; INFILTRATION; PERINEURAL
Status: DISCONTINUED | OUTPATIENT
Start: 2017-05-18 | End: 2017-05-19

## 2017-05-18 RX ORDER — IBUPROFEN 400 MG/1
800 TABLET ORAL EVERY 8 HOURS
Status: DISCONTINUED | OUTPATIENT
Start: 2017-05-19 | End: 2017-05-19

## 2017-05-18 RX ORDER — OXYTOCIN/RINGER'S LACTATE 20/1000 ML
333 PLASTIC BAG, INJECTION (ML) INTRAVENOUS
Status: DISCONTINUED | OUTPATIENT
Start: 2017-05-18 | End: 2017-05-19

## 2017-05-18 RX ADMIN — ROPIVACAINE HYDROCHLORIDE 14 ML/HR: 2 INJECTION, SOLUTION EPIDURAL; INFILTRATION at 10:05

## 2017-05-18 RX ADMIN — BUTORPHANOL TARTRATE 1 MG: 1 INJECTION, SOLUTION INTRAMUSCULAR; INTRAVENOUS at 06:05

## 2017-05-18 RX ADMIN — SODIUM CHLORIDE, SODIUM LACTATE, POTASSIUM CHLORIDE, AND CALCIUM CHLORIDE 1000 ML: .6; .31; .03; .02 INJECTION, SOLUTION INTRAVENOUS at 10:05

## 2017-05-18 RX ADMIN — LIDOCAINE HYDROCHLORIDE AND EPINEPHRINE 3 ML: 15; 5 INJECTION, SOLUTION EPIDURAL; INFILTRATION; INTRACAUDAL; PERINEURAL at 10:05

## 2017-05-18 RX ADMIN — ROPIVACAINE HYDROCHLORIDE 15 ML: 2 INJECTION, SOLUTION EPIDURAL; INFILTRATION at 10:05

## 2017-05-18 RX ADMIN — Medication 2 MILLI-UNITS/MIN: at 06:05

## 2017-05-18 RX ADMIN — SODIUM CHLORIDE, SODIUM LACTATE, POTASSIUM CHLORIDE, AND CALCIUM CHLORIDE: .6; .31; .03; .02 INJECTION, SOLUTION INTRAVENOUS at 08:05

## 2017-05-18 NOTE — H&P
[facility] -   Obstetrics  History & Physical    Patient Name: Venessa Hernandez  MRN: [id number]  Admission Date: 2017  Primary Care Provider: Marcos Leary MD    Subjective:     Principal Problem:Elevated blood pressure affecting pregnancy in third trimester, antepartum    History of Present Illness:  Pt was sent from the office today for increased BP. Pt denies HA, blurred vision, RUQ pain. She had a bad HA last Thursday. US done in office BPP     Obstetric HPI:  Patient reports None contractions, active fetal movement, No vaginal bleeding , No loss of fluid     This pregnancy has been uncomplicated     Obstetric History       T1      TAB0   SAB0   E0   M0   L1       # Outcome Date GA Lbr Vernon/2nd Weight Sex Delivery Anes PTL Lv   2 Current            1 Term      Vag-Spont           No past medical history on file.  No past surgical history on file.    PTA Medications   Medication Sig    acetaminophen (TYLENOL) 325 MG tablet Take 325 mg by mouth every 6 (six) hours as needed for Pain.    PNV62/FA/OM3/DHA/EPA/FISH OIL (PRENATAL GUMMY ORAL) Take by mouth.    ranitidine (ZANTAC) 75 MG tablet Take 75 mg by mouth 2 (two) times daily.       Review of patient's allergies indicates:  No Known Allergies     Family History     None        Social History Main Topics    Smoking status: Never Smoker    Smokeless tobacco: Never Used    Alcohol use Yes      Comment: social    Drug use: No    Sexual activity: Yes     Partners: Male     Birth control/ protection: None     Review of Systems   All other systems reviewed and are negative.     Objective:     Vital Signs (Most Recent):    Vital Signs (24h Range):  BP: (144)/(78) 144/78        There is no height or weight on file to calculate BMI.    FHT: Cat 1 (reassuring)  TOCO: x1 ctx    Physical Exam:   Constitutional: She is oriented to person, place, and time. She appears well-developed and well-nourished.             Abdominal:  Soft.                 Neurological: She is alert and oriented to person, place, and time. She has normal reflexes.    Skin: Skin is warm and dry.    Psychiatric: She has a normal mood and affect. Her behavior is normal.     SVE: Deferred    Significant Labs:  Lab Results   Component Value Date    GROUPTRH A NEG 2017    HEPBSAG Negative 10/14/2016    STREPBCULT No Group B Streptococcus isolated 2017       I have personallly reviewed all pertinent lab results from the last 24 hours.    Assessment/Plan:     27 y.o. female  at 39w6d for:    * Elevated blood pressure affecting pregnancy in third trimester, antepartum  Continuous EFM  PreE workup  BP series      Miley Hull CNM  Obstetrics  [facility]

## 2017-05-18 NOTE — PROGRESS NOTES
Pt here for pre E workup, negative labs, BPs normal. EFM Cat 2 tracing, intermittent decels noted, moderate variability, pt repositioned from high mason's to right side, SVE 1cm/50/v/-3 soft. Dr. Lowe called and report provided. al

## 2017-05-18 NOTE — LACTATION NOTE
"Discussed feeding choice with mother.  Reviewed benefits of breastfeeding.  Patient given "What to Expect in the First 48 Hours" handout. Mother states her intention is breastfeeding   "

## 2017-05-18 NOTE — PROGRESS NOTES
US-CHELLE-26.5, MVP-8.5cm BPP 8/8,Fluid stable  BP recheck 146/90, states headaches this past week, occasional visual disturbances. Denies nausea, RUQ tenderness Slightly brisk reflexes  To L&D for PIH work up- will schedule appt for Monday if undelivered over w/e with view to schedule IOL as indicated  Advise rest for remainder of pregnancy

## 2017-05-18 NOTE — PLAN OF CARE
Problem: Patient Care Overview  Goal: Plan of Care Review  Outcome: Ongoing (interventions implemented as appropriate)  Patient plans on natural labor and delivery but is also open to epidural. Patient sent from office visit today for a PIH w/u. During observation FHT's with lates. Patient admitted for induction of labor.

## 2017-05-18 NOTE — SUBJECTIVE & OBJECTIVE
Interval History:  Venessa is a 27 y.o.  at 39w6d. She is doing well.     Objective:     Vital Signs (Most Recent):  Temp: 98.8 °F (37.1 °C) (17 1245)  Pulse: 89 (17 1537)  Resp: 18 (17 1245)  BP: (!) 143/79 (17 1533)  SpO2: 99 % (17 1537) Vital Signs (24h Range):  Temp:  [98.8 °F (37.1 °C)] 98.8 °F (37.1 °C)  Pulse:  [] 89  Resp:  [18] 18  SpO2:  [97 %-100 %] 99 %  BP: (112-144)/(53-92) 143/79        There is no height or weight on file to calculate BMI.    FHT: Cat 1 (reassuring)  TOCO: Irregular UC    Cervical Exam: /v/-3 Cooks catheter placed, inflated each bulb with 80 cc sterile water       Significant Labs:  Lab Results   Component Value Date    GROUPTRH A NEG 2017    HEPBSAG Negative 10/14/2016    STREPBCULT No Group B Streptococcus isolated 2017       I have personallly reviewed all pertinent lab results from the last 24 hours.    Physical Exam

## 2017-05-18 NOTE — SUBJECTIVE & OBJECTIVE
Obstetric HPI:  Patient reports None contractions, active fetal movement, No vaginal bleeding , No loss of fluid     This pregnancy has been uncomplicated     Obstetric History       T1      TAB0   SAB0   E0   M0   L1       # Outcome Date GA Lbr Vernon/2nd Weight Sex Delivery Anes PTL Lv   2 Current            1 Term      Vag-Spont           No past medical history on file.  No past surgical history on file.    PTA Medications   Medication Sig    acetaminophen (TYLENOL) 325 MG tablet Take 325 mg by mouth every 6 (six) hours as needed for Pain.    PNV62/FA/OM3/DHA/EPA/FISH OIL (PRENATAL GUMMY ORAL) Take by mouth.    ranitidine (ZANTAC) 75 MG tablet Take 75 mg by mouth 2 (two) times daily.       Review of patient's allergies indicates:  No Known Allergies     Family History     None        Social History Main Topics    Smoking status: Never Smoker    Smokeless tobacco: Never Used    Alcohol use Yes      Comment: social    Drug use: No    Sexual activity: Yes     Partners: Male     Birth control/ protection: None     Review of Systems   All other systems reviewed and are negative.     Objective:     Vital Signs (Most Recent):    Vital Signs (24h Range):  BP: (144)/(78) 144/78        There is no height or weight on file to calculate BMI.    FHT: Cat 1 (reassuring)  TOCO: x1 ctx    Physical Exam:   Constitutional: She is oriented to person, place, and time. She appears well-developed and well-nourished.             Abdominal: Soft.                 Neurological: She is alert and oriented to person, place, and time. She has normal reflexes.    Skin: Skin is warm and dry.    Psychiatric: She has a normal mood and affect. Her behavior is normal.     SVE: Deferred    Significant Labs:  Lab Results   Component Value Date    GROUPTRH A NEG 2017    HEPBSAG Negative 10/14/2016    STREPBCULT No Group B Streptococcus isolated 2017       I have personallly reviewed all pertinent lab results from the  last 24 hours.

## 2017-05-18 NOTE — ANESTHESIA PREPROCEDURE EVALUATION
05/18/2017  Venessa Hernandez is a 27 y.o., female.    Anesthesia Evaluation    I have reviewed the Patient Summary Reports.    I have reviewed the Nursing Notes.      Review of Systems  Anesthesia Hx:  No problems with previous Anesthesia  Denies Family Hx of Anesthesia complications.   Denies Personal Hx of Anesthesia complications.   Social:  No Alcohol Use, Non-Smoker    Hematology/Oncology:  Hematology Normal   Oncology Normal     EENT/Dental:EENT/Dental Normal   Cardiovascular:  Cardiovascular Normal Exercise tolerance: good     Pulmonary:  Pulmonary Normal    Renal/:  Renal/ Normal     Hepatic/GI:  Hepatic/GI Normal    Musculoskeletal:  Musculoskeletal Normal    Neurological:  Neurology Normal    Endocrine:  Endocrine Normal    Dermatological:  Skin Normal    Psych:  Psychiatric Normal           Physical Exam  General:  Well nourished    Airway/Jaw/Neck:  Airway Findings: Mouth Opening: Normal Tongue: Normal  General Airway Assessment: Adult, Good  Mallampati: II  Improves to II with phonation.  TM Distance: Normal, at least 6 cm        Eyes/Ears/Nose:  EYES/EARS/NOSE FINDINGS: Normal   Dental:  Dental Findings: In tact   Chest/Lungs:  Chest/Lungs Findings: Clear to auscultation, Normal Respiratory Rate     Heart/Vascular:  Heart Findings: Rate: Normal  Rhythm: Regular Rhythm  Heart murmur: negative Vascular Findings: Normal    Abdomen:  Abdomen Findings: Normal    Musculoskeletal:  Musculoskeletal Findings: Normal   Skin:  Skin Findings: Normal    Mental Status:  Mental Status Findings:  Cooperative, Alert and Oriented         Anesthesia Plan  Type of Anesthesia, risks & benefits discussed:  Anesthesia Type:  epidural  Patient's Preference:   Intra-op Monitoring Plan:   Intra-op Monitoring Plan Comments:   Post Op Pain Control Plan:   Post Op Pain Control Plan Comments:   Induction:    Beta  Blocker:  Patient is not currently on a Beta-Blocker (No further documentation required).       Informed Consent: Patient understands risks and agrees with Anesthesia plan.  Questions answered. Anesthesia consent signed with patient.  ASA Score: 2     Day of Surgery Review of History & Physical: I have interviewed and examined the patient. I have reviewed the patient's H&P dated:  There are no significant changes.          Ready For Surgery From Anesthesia Perspective.

## 2017-05-18 NOTE — PROGRESS NOTES
Coffective counseling sheet Fall in Love discussed with mother. Reinforced immediate skin to skin, the magic first hour, importance of the first feeding and delaying routine procedures. Encouraged mother to download Coffective mobile nicci if she has not already done so. Mother verbalies understanding.Coffective counseling sheet Fall in Love discussed with mother. Reinforced immediate skin to skin, the magic first hour, importance of the first feeding and delaying routine procedures. Encouraged mother to download Coffective mobile nicci if she has not already done so. Mother verbalies understanding.

## 2017-05-18 NOTE — PROGRESS NOTES
Was called to evaluate for possible  for Category 2 FHT's.  Upon arrival, baby had moderate BTBV with intermittent late decelerations.  With maternal repositioning, decelerations resolved and FHT's now Category 1 and reassuring with accelerations.  Cervix 1cm.  Admit and start induction.  Will monitor FHT's closely.

## 2017-05-18 NOTE — PLAN OF CARE
Problem: Labor (Cervical Ripen, Induct, Augment) (Adult,Obstetrics,Pediatric)  Goal: Signs and Symptoms of Listed Potential Problems Will be Absent, Minimized or Managed (Labor)  Signs and symptoms of listed potential problems will be absent, minimized or managed by discharge/transition of care (reference Labor (Cervical Ripen, Induct, Augment) (Adult,Obstetrics,Pediatric) CPG).  Outcome: Ongoing (interventions implemented as appropriate)  Patient admitted for induction of labor . Intermittent decels of FHT;s. Cook induction ballon inserted.

## 2017-05-19 PROBLEM — Z87.59 STATUS POST VACUUM-ASSISTED VAGINAL DELIVERY: Status: ACTIVE | Noted: 2017-05-19

## 2017-05-19 PROBLEM — O40.3XX0 POLYHYDRAMNIOS AFFECTING PREGNANCY IN THIRD TRIMESTER: Status: RESOLVED | Noted: 2017-05-03 | Resolved: 2017-05-19

## 2017-05-19 PROBLEM — O47.9 BRAXTON HICKS CONTRACTIONS: Status: RESOLVED | Noted: 2017-02-21 | Resolved: 2017-05-19

## 2017-05-19 PROBLEM — R03.0 ELEVATED BP WITHOUT DIAGNOSIS OF HYPERTENSION: Status: RESOLVED | Noted: 2017-05-18 | Resolved: 2017-05-19

## 2017-05-19 LAB
ABO + RH BLD: NORMAL
FETAL CELL SCN BLD QL ROSETTE: NORMAL

## 2017-05-19 PROCEDURE — 51701 INSERT BLADDER CATHETER: CPT

## 2017-05-19 PROCEDURE — 86901 BLOOD TYPING SEROLOGIC RH(D): CPT

## 2017-05-19 PROCEDURE — 63600175 PHARM REV CODE 636 W HCPCS: Performed by: NURSE ANESTHETIST, CERTIFIED REGISTERED

## 2017-05-19 PROCEDURE — 59400 OBSTETRICAL CARE: CPT | Mod: ,,, | Performed by: OBSTETRICS & GYNECOLOGY

## 2017-05-19 PROCEDURE — 10907ZC DRAINAGE OF AMNIOTIC FLUID, THERAPEUTIC FROM PRODUCTS OF CONCEPTION, VIA NATURAL OR ARTIFICIAL OPENING: ICD-10-PCS | Performed by: ADVANCED PRACTICE MIDWIFE

## 2017-05-19 PROCEDURE — 72200005 HC VAGINAL DELIVERY LEVEL II

## 2017-05-19 PROCEDURE — 11000001 HC ACUTE MED/SURG PRIVATE ROOM

## 2017-05-19 PROCEDURE — 85461 HEMOGLOBIN FETAL: CPT

## 2017-05-19 PROCEDURE — 25000003 PHARM REV CODE 250: Performed by: ADVANCED PRACTICE MIDWIFE

## 2017-05-19 PROCEDURE — 86900 BLOOD TYPING SEROLOGIC ABO: CPT

## 2017-05-19 PROCEDURE — 72100003 HC LABOR CARE, EA. ADDL. 8 HRS

## 2017-05-19 PROCEDURE — 25000003 PHARM REV CODE 250: Performed by: OBSTETRICS & GYNECOLOGY

## 2017-05-19 PROCEDURE — 36415 COLL VENOUS BLD VENIPUNCTURE: CPT

## 2017-05-19 RX ORDER — PROMETHAZINE HYDROCHLORIDE 25 MG/1
25 TABLET ORAL EVERY 6 HOURS PRN
Status: DISCONTINUED | OUTPATIENT
Start: 2017-05-19 | End: 2017-05-21 | Stop reason: HOSPADM

## 2017-05-19 RX ORDER — OXYTOCIN/RINGER'S LACTATE 20/1000 ML
333 PLASTIC BAG, INJECTION (ML) INTRAVENOUS
Status: DISCONTINUED | OUTPATIENT
Start: 2017-05-19 | End: 2017-05-21 | Stop reason: HOSPADM

## 2017-05-19 RX ORDER — AMMONIA 15 % (W/V)
0.3 AMPUL (EA) INHALATION CONTINUOUS PRN
Status: DISCONTINUED | OUTPATIENT
Start: 2017-05-19 | End: 2017-05-21 | Stop reason: HOSPADM

## 2017-05-19 RX ORDER — OXYCODONE AND ACETAMINOPHEN 5; 325 MG/1; MG/1
1 TABLET ORAL EVERY 4 HOURS PRN
Status: DISCONTINUED | OUTPATIENT
Start: 2017-05-19 | End: 2017-05-21 | Stop reason: HOSPADM

## 2017-05-19 RX ORDER — OXYCODONE AND ACETAMINOPHEN 10; 325 MG/1; MG/1
1 TABLET ORAL EVERY 4 HOURS PRN
Status: DISCONTINUED | OUTPATIENT
Start: 2017-05-19 | End: 2017-05-21 | Stop reason: HOSPADM

## 2017-05-19 RX ORDER — DOCUSATE SODIUM 100 MG/1
100 CAPSULE, LIQUID FILLED ORAL DAILY
Status: DISCONTINUED | OUTPATIENT
Start: 2017-05-19 | End: 2017-05-21 | Stop reason: HOSPADM

## 2017-05-19 RX ORDER — IBUPROFEN 600 MG/1
600 TABLET ORAL EVERY 6 HOURS
Status: DISCONTINUED | OUTPATIENT
Start: 2017-05-19 | End: 2017-05-21 | Stop reason: HOSPADM

## 2017-05-19 RX ORDER — DIPHENHYDRAMINE HCL 25 MG
25 CAPSULE ORAL EVERY 4 HOURS PRN
Status: DISCONTINUED | OUTPATIENT
Start: 2017-05-19 | End: 2017-05-21 | Stop reason: HOSPADM

## 2017-05-19 RX ORDER — HYDROCORTISONE 25 MG/G
CREAM TOPICAL 3 TIMES DAILY PRN
Status: DISCONTINUED | OUTPATIENT
Start: 2017-05-19 | End: 2017-05-21 | Stop reason: HOSPADM

## 2017-05-19 RX ORDER — ACETAMINOPHEN 325 MG/1
650 TABLET ORAL EVERY 6 HOURS PRN
Status: DISCONTINUED | OUTPATIENT
Start: 2017-05-19 | End: 2017-05-21 | Stop reason: HOSPADM

## 2017-05-19 RX ORDER — ONDANSETRON 8 MG/1
8 TABLET, ORALLY DISINTEGRATING ORAL EVERY 8 HOURS PRN
Status: DISCONTINUED | OUTPATIENT
Start: 2017-05-19 | End: 2017-05-21 | Stop reason: HOSPADM

## 2017-05-19 RX ADMIN — DOCUSATE SODIUM 100 MG: 100 CAPSULE, LIQUID FILLED ORAL at 09:05

## 2017-05-19 RX ADMIN — OXYCODONE HYDROCHLORIDE AND ACETAMINOPHEN 1 TABLET: 10; 325 TABLET ORAL at 09:05

## 2017-05-19 RX ADMIN — IBUPROFEN 600 MG: 600 TABLET, FILM COATED ORAL at 05:05

## 2017-05-19 RX ADMIN — ROPIVACAINE HYDROCHLORIDE: 2 INJECTION, SOLUTION EPIDURAL; INFILTRATION at 03:05

## 2017-05-19 RX ADMIN — IBUPROFEN 600 MG: 600 TABLET, FILM COATED ORAL at 12:05

## 2017-05-19 RX ADMIN — OXYCODONE HYDROCHLORIDE AND ACETAMINOPHEN 1 TABLET: 10; 325 TABLET ORAL at 04:05

## 2017-05-19 RX ADMIN — ONDANSETRON 8 MG: 8 TABLET, ORALLY DISINTEGRATING ORAL at 12:05

## 2017-05-19 RX ADMIN — SODIUM CHLORIDE, SODIUM LACTATE, POTASSIUM CHLORIDE, AND CALCIUM CHLORIDE: .6; .31; .03; .02 INJECTION, SOLUTION INTRAVENOUS at 02:05

## 2017-05-19 RX ADMIN — Medication 333 MILLI-UNITS/MIN: at 07:05

## 2017-05-19 NOTE — ASSESSMENT & PLAN NOTE
Cooks catheter removed, 5cm/70/v/-3 AROM clear, blood tinged fluid noted, EFM reassuring, variable decel X 1 with AROM

## 2017-05-19 NOTE — PLAN OF CARE
Problem: Patient Care Overview  Goal: Plan of Care Review  Outcome: Ongoing (interventions implemented as appropriate)  Vacuum assisted vaginal delivery at 0747, NICU attended delivery due to prolonged decreased fetal heart tones. Delayed skin to skin due to  complications. Light bleeding throughout recovery. Percocet 10 mg given for post delivery cramping, family at bedside for support.

## 2017-05-19 NOTE — ANESTHESIA POSTPROCEDURE EVALUATION
Anesthesia Post Evaluation    Patient: Venessa Hernandez    Procedure(s) Performed: * No procedures listed *    Final Anesthesia Type: epidural  Patient location during evaluation: labor & delivery  Patient participation: Yes- Able to Participate  Level of consciousness: awake and alert and oriented  Post-procedure vital signs: reviewed and stable  Pain management: adequate  Airway patency: patent  PONV status at discharge: No PONV  Anesthetic complications: no      Cardiovascular status: blood pressure returned to baseline  Respiratory status: unassisted, spontaneous ventilation and room air  Hydration status: euvolemic  Follow-up not needed.        Visit Vitals    BP (!) 95/57    Pulse 83    Temp 36.9 °C (98.4 °F) (Oral)    Resp 18    LMP 08/12/2016    SpO2 99%    Breastfeeding No       Pain/Madie Score: Pain Rating Prior to Med Admin: 6 (5/18/2017  6:42 PM)

## 2017-05-19 NOTE — PROGRESS NOTES
[facility]  Obstetrics  Labor Progress Note    Patient Name: Venessa Hernandez  MRN: [id number]  Admission Date: 2017  Hospital Length of Stay: 1 days  Attending Physician: Sho Lowe MD  Primary Care Provider: Marcos Leary MD    Subjective:     Principal Problem:Fetal heart rate decelerations affecting management of mother    Hospital Course:  PreE workup negative, BP stable  Induction of labor, equivocal EFM, intermittent decels resolved spontaneously      Interval History:  Venessa is a 27 y.o.  at 40w0d. She is comfortable with epidural    Objective:     Vital Signs (Most Recent):  Temp: 97.7 °F (36.5 °C) (17 0033)  Pulse: (!) 56 (17 0402)  Resp: 18 (17 0033)  BP: (!) 103/53 (17 0402)  SpO2: 99 % (17 1537) Vital Signs (24h Range):  Temp:  [97.7 °F (36.5 °C)-98.8 °F (37.1 °C)] 97.7 °F (36.5 °C)  Pulse:  [] 56  Resp:  [18] 18  SpO2:  [97 %-100 %] 99 %  BP: ()/(49-92) 103/53        There is no height or weight on file to calculate BMI.    FHT: Cat 1 (reassuring)  TOCO:  Q 3 minutes    Cervical Exam:  Dilation:  5  Effacement:  70  Station: -3  Presentation: Vertex     Significant Labs:  Lab Results   Component Value Date    GROUPTRH A NEG 2017    HEPBSAG Negative 10/14/2016    STREPBCULT No Group B Streptococcus isolated 2017       I have personallly reviewed all pertinent lab results from the last 24 hours.    Physical Exam    Assessment/Plan:     27 y.o. female  at 40w0d for:    * Fetal heart rate decelerations affecting management of mother  Cooks catheter removed, 5cm/70/v/-3 AROM clear, blood tinged fluid noted, EFM reassuring, variable decel X 1 with AROM        Miley Hull CNM  Obstetrics  [facility]

## 2017-05-19 NOTE — LACTATION NOTE
Baby is sleeping under the warmer at my arrival in the room. Baby has been stable so far, with no desaturation. Baby is showing feeding cues. Helped mother to settle in a football hold position on the left breast. Reviewed deep asymmetric latch and proper positioning. Mother is able to demonstrate back and deep latch easily obtained. Audible swallows noted, and mother denies pain or discomfort. Baby fed until content, and nipple shape and color is WDL upon unlatching. Baby burped, and mother independently latched the baby on the right breast in a football hold. Denies pain or discomfort.   Will follow baby's progress with breastfeeding, as it was a traumatic delivery.   Lactation packet given and admit information reviewed. Mother verbalizes understanding of expected  behaviors and output for the first 48 hours of life.  Discussed the importance of cue based feedings on demand, unrestricted access to the breast, and frequent uninterrupted skin to skin contact.  Risk and implications of artificial nipples and supplementation discussed.  Encouraged mother to call for assistance when desired or when infant is showing signs of hunger, contact number provided, mother verbalizes understanding.     17 1145   Infant Information   Infant's Name Cotton   Maternal Infant Assessment   Breast Size Issue none   Breast Shape Bilateral:;round   Breast Density Bilateral:;soft   Areola Bilateral:;elastic   Nipple(s) Bilateral:;everted   LATCH Score   Latch 2-->grasps breast, tongue down, lips flanged, rhythmic sucking   Audible Swallowing 2-->spontaneous and intermittent (24 hrs old)   Type Of Nipple 2-->everted (after stimulation)   Comfort (Breast/Nipple) 2-->soft/nontender   Hold (Positioning) 1-->minimal assist, teach one side: mother does other, staff holds   Score (less than 7 for 2/more consecutive times, consult Lactation Consultant) 9   Maternal Infant Feeding   Maternal Preparation hand hygiene;breast care  "  Maternal Emotional State independent   Infant Positioning clutch/"football"   Signs of Milk Transfer audible swallow   Presence of Pain no   Latch Assistance yes   Breastfeeding Education adequate milk volume;adequate infant intake;diet;importance of skin-to-skin contact;increasing milk supply   Feeding Infant   Feeding Readiness Cues eager;crying   Effective Latch During Feeding yes   Audible Swallow yes   Suck/Swallow Coordination present   Lactation Interventions   Attachment Promotion skin-to-skin contact encouraged;rooming-in promoted;role responsibility promoted;family involvement promoted;environment adjusted;counseling provided;breastfeeding assistance provided;face-to-face positioning promoted;privacy provided;infant-mother separation minimized   Breast Care: Breastfeeding milk massaged towards nipple;lanolin to nipple(s) applied;manual expression to soften breast   Breastfeeding Assistance assisted with positioning;both breasts offered each feeding;feeding cue recognition promoted;feeding on demand promoted;feeding session observed;support offered   Maternal Breastfeeding Support infant-mother separation minimized;encouragement offered;lactation counseling provided   Latch Promotion positioning assisted     "

## 2017-05-19 NOTE — PROGRESS NOTES
Pt c/o pain, had Stadol, declines additional dose at this time, Cat 1 tracing, UC Q 4 min, pitocin at 10 mu/min, Cooks catheter still in place, cx is thinned  A: induction of labor  P: CPM

## 2017-05-19 NOTE — TRANSFER OF CARE
Anesthesia Transfer of Care Note    Patient: Venessa Hernandez    Procedure(s) Performed: * No procedures listed *    Patient location: Labor and Delivery    Anesthesia Type: epidural    Post pain: adequate analgesia    Post assessment: no apparent anesthetic complications    Post vital signs: stable    Level of consciousness: awake, alert and oriented    Nausea/Vomiting: no nausea/vomiting    Complications: none    Transfer of care protocol was followed      Last vitals:   Visit Vitals    BP (!) 95/57    Pulse 83    Temp 36.9 °C (98.4 °F) (Oral)    Resp 18    LMP 08/12/2016    SpO2 99%    Breastfeeding No

## 2017-05-19 NOTE — ANESTHESIA PROCEDURE NOTES
Epidural    Patient location during procedure: OB   Reason for block: primary anesthetic   Diagnosis: iup   Start time: 5/18/2017 10:16 PM  Timeout: 5/18/2017 10:14 PM  End time: 5/18/2017 10:35 PM  Staffing  Anesthesiologist: ANTONIETTA NAIK  Resident/CRNA: ROHAN DIXON  Performed by: anesthesiologist   Preanesthetic Checklist  Completed: patient identified, pre-op evaluation, timeout performed, IV checked, risks and benefits discussed, monitors and equipment checked, anesthesia consent given, hand hygiene performed and patient being monitored  Preparation  Patient position: sitting  Prep: Betadine  Patient monitoring: Pulse Ox and Blood Pressure  Epidural  Skin Anesthetic: lidocaine 1%  Administration type: continuous  Approach: midline  Interspace: L3-4  Injection technique: KRISTINE air  Needle and Epidural Catheter  Needle type: Tuohy   Needle gauge: 18  Needle length: 3.5 inches  Needle insertion depth: 6 cm  Catheter type: multi-orifice  Catheter size: 20 G  Catheter at skin depth: 12 cm  Test dose: 3 mL of lidocaine 1.5% with Epi 1-to-200,000  Additional Documentation: incremental injection, negative aspiration for heme and CSF, no signs/symptoms of IV or SA injection, no paresthesia on injection, no significant pain on injection and no significant complaints from patient  Needle localization: anatomical landmarks  Medications:  Bolus administered: 15 mL of 0.2% ropivacaine  Volume per aspiration: 5 mL  Time between aspirations: 1 minutes  Assessment  Ease of block: easy  Patient's tolerance of the procedure: comfortable throughout block

## 2017-05-19 NOTE — SUBJECTIVE & OBJECTIVE
Interval History:  Venessa is a 27 y.o.  at 40w0d. She is comfortable with epidural    Objective:     Vital Signs (Most Recent):  Temp: 97.7 °F (36.5 °C) (17 0033)  Pulse: (!) 56 (17 0402)  Resp: 18 (17 0033)  BP: (!) 103/53 (17 0402)  SpO2: 99 % (17 1537) Vital Signs (24h Range):  Temp:  [97.7 °F (36.5 °C)-98.8 °F (37.1 °C)] 97.7 °F (36.5 °C)  Pulse:  [] 56  Resp:  [18] 18  SpO2:  [97 %-100 %] 99 %  BP: ()/(49-92) 103/53        There is no height or weight on file to calculate BMI.    FHT: Cat 1 (reassuring)  TOCO:  Q 3 minutes    Cervical Exam:  Dilation:  5  Effacement:  70  Station: -3  Presentation: Vertex     Significant Labs:  Lab Results   Component Value Date    GROUPTRH A NEG 2017    HEPBSAG Negative 10/14/2016    STREPBCULT No Group B Streptococcus isolated 2017       I have personallly reviewed all pertinent lab results from the last 24 hours.    Physical Exam

## 2017-05-19 NOTE — ANESTHESIA RELEASE NOTE
Anesthesia Release from PACU Note    Patient: Venessa Hernandez    Procedure(s) Performed: * No procedures listed *    Anesthesia type: epidural    Post pain: Adequate analgesia    Post assessment: no apparent anesthetic complications and tolerated procedure well    Last Vitals:   Visit Vitals    BP (!) 95/57    Pulse 83    Temp 36.9 °C (98.4 °F) (Oral)    Resp 18    LMP 08/12/2016    SpO2 99%    Breastfeeding No       Post vital signs: stable    Level of consciousness: awake, alert  and oriented    Nausea/Vomiting: no nausea/no vomiting    Complications: none    Airway Patency: patent    Respiratory: unassisted, spontaneous ventilation, room air    Cardiovascular: stable and blood pressure at baseline    Hydration: euvolemic

## 2017-05-19 NOTE — L&D DELIVERY NOTE
Delivery Information for  Ok Hernandez    Birth information:  YOB: 2018   Time of birth: 7:47 AM   Sex: male   Head Delivery Date/Time: 2017  7:46 AM   Delivery type: Vaginal, Vacuum (Extractor)   Gestational Age: 40w0d    Delivery Providers    Delivering clinician:  BENNIE ONEIL                   [place] Assessment    No data filed          Assisted Delivery Details:    Vacuum extractor attempted?:  Yes   Vacuum indications:  Fetal Heart Rate or Rhythm Abnormality   Vacuum type:  bell cup   Vacuum application location:  Low   Number of pop offs:  0   Number of pulls with vacuum:  2   Vacuum applied by:  DR. ONEIL   Failed vacuum delivery?:  No                   Presentation and Position    Presentation: Vertex   Position:                    Interventions/Resuscitation         Cord    Complications:  Nuchal   Nuchal Cord Description:  tight nuchal cord   Number of Loops:  1   Delayed Cord Clamping?:  No        Placenta    Removal:  Spontaneous   Appearance:  Intact            Labor Events:       labor:       Labor Onset Date/Time:         Dilation Complete Date/Time:         Start Pushing Date/Time:       Rupture Date/Time:              Rupture type:           Fluid Amount:        Fluid Color:        Fluid Odor:        Membrane Status (PeriCalm): ARM (Artificial Rupture)      Rupture Date/Time (PeriCalm): 2017 04:57:00      Fluid Amount (PeriCalm): Polyhydramnios      Fluid Color (PeriCalm): Clear       steroids:       Antibiotics given for GBS:       Induction:       Indications for induction:        Augmentation:       Indications for augmentation:       Labor complications:       Additional complications:          Cervical ripening:                     Delivery:      Episiotomy: None     Indication for Episiotomy:       Perineal Lacerations: None Repaired:      Periurethral Laceration: none Repaired:     Labial Laceration: none Repaired:     Sulcus  Laceration:   Repaired:     Vaginal Laceration: No Repaired:     Cervical Laceration: No Repaired:     Repair suture: None     Repair # of packets:       Vaginal delivery QBL (mL): 300      QBL (mL): 0     Combined Blood Loss (mL): 300     Vaginal Sweep Performed: No     Surgicount Correct: Yes       Other providers:       Anesthesia    Method:  Epidural              Details (if applicable):  Trial of Labor      Categorization:      Priority:     Indications for :     Incision Type:       Additional  information:  Forceps:    Vacuum:    Breech:    Observed anomalies    Other (Comments):         Due to prolonged fetal bradycardia, verbal consent obtained from patient for vacuum delivery.  Vacuum extractor applied without difficulty at +2 station.  Fetal head delivered without difficulty with two pulls of vacuum.  Tight nuchal cord was not reducible and was clamped and cut at perineum.  Remainder of infant delivered without difficulty.  Perineum intact with no lacerations.  Placenta delivered without difficulty.  Infant weighed 3130 grams.  Apgar scores 8/9.

## 2017-05-19 NOTE — NURSING
Pt assisted to the bathroom at this time without difficulty. Kaden bottle used, new pads, underwear and gown given.

## 2017-05-19 NOTE — PLAN OF CARE
Problem: Patient Care Overview  Goal: Plan of Care Review  Outcome: Ongoing (interventions implemented as appropriate)  Pt epiduralized, AROM at 457 blood tinged. Afebrile, comfortable.  at bedside.

## 2017-05-20 PROBLEM — O36.8390 FETAL HEART RATE DECELERATIONS AFFECTING MANAGEMENT OF MOTHER: Status: RESOLVED | Noted: 2017-05-18 | Resolved: 2017-05-20

## 2017-05-20 PROBLEM — O16.3 ELEVATED BLOOD PRESSURE AFFECTING PREGNANCY IN THIRD TRIMESTER, ANTEPARTUM: Status: RESOLVED | Noted: 2017-05-18 | Resolved: 2017-05-20

## 2017-05-20 PROBLEM — Z87.59 STATUS POST VACUUM-ASSISTED VAGINAL DELIVERY: Status: RESOLVED | Noted: 2017-05-19 | Resolved: 2017-05-20

## 2017-05-20 LAB — INJECT RH IG VOL PATIENT: NORMAL ML

## 2017-05-20 PROCEDURE — 11000001 HC ACUTE MED/SURG PRIVATE ROOM

## 2017-05-20 PROCEDURE — 25000003 PHARM REV CODE 250: Performed by: OBSTETRICS & GYNECOLOGY

## 2017-05-20 RX ADMIN — OXYCODONE HYDROCHLORIDE AND ACETAMINOPHEN 1 TABLET: 10; 325 TABLET ORAL at 11:05

## 2017-05-20 RX ADMIN — OXYCODONE HYDROCHLORIDE AND ACETAMINOPHEN 1 TABLET: 10; 325 TABLET ORAL at 10:05

## 2017-05-20 RX ADMIN — IBUPROFEN 600 MG: 600 TABLET, FILM COATED ORAL at 05:05

## 2017-05-20 RX ADMIN — IBUPROFEN 600 MG: 600 TABLET, FILM COATED ORAL at 06:05

## 2017-05-20 RX ADMIN — IBUPROFEN 600 MG: 600 TABLET, FILM COATED ORAL at 11:05

## 2017-05-20 RX ADMIN — IBUPROFEN 600 MG: 600 TABLET, FILM COATED ORAL at 12:05

## 2017-05-20 RX ADMIN — DOCUSATE SODIUM 100 MG: 100 CAPSULE, LIQUID FILLED ORAL at 09:05

## 2017-05-20 NOTE — SUBJECTIVE & OBJECTIVE
Interval History:  Venessa Hernandez is a 27 y.o. female status post Spontaneous vaginal delivery on 5/19/17 07:47 AM  at 40w0d. Patient is doing well today. She denies nausea, vomiting, fever or chills.  Patient reports moderate abdominal pain and vaginal soreness that is with difficulty relieved by NSAID oral pain medications. Educated on available PRN pain medication. Patient states she would like a Percocet, given by nurse. Vaginal bleeding is light. Patient is voiding without difficulty and ambulating with no difficulty. She has passed flatus, and has not had BM. Taking stool softeners.    Patient is breastfeeding. Denies need for contraception. She desires circumcision for her male baby: yes.     Objective:     Vital Signs (Most Recent):  Temp: 98.7 °F (37.1 °C) (05/20/17 0800)  Pulse: 71 (05/20/17 0800)  Resp: 20 (05/20/17 0800)  BP: (!) 115/59 (05/20/17 0800)  SpO2: 100 % (05/19/17 0850) Vital Signs (24h Range):  Temp:  [97.7 °F (36.5 °C)-98.7 °F (37.1 °C)] 98.7 °F (37.1 °C)  Pulse:  [68-82] 71  Resp:  [17-20] 20  BP: (105-120)/(55-62) 115/59        There is no height or weight on file to calculate BMI.      Intake/Output Summary (Last 24 hours) at 05/20/17 1119  Last data filed at 05/19/17 2244   Gross per 24 hour   Intake                0 ml   Output             1100 ml   Net            -1100 ml       Significant Labs:  Lab Results   Component Value Date    GROUPTRH A NEG 05/19/2017    HEPBSAG Negative 10/14/2016    STREPBCULT No Group B Streptococcus isolated 04/26/2017       Recent Labs  Lab 05/18/17  1312   HGB 9.8*   HCT 30.3*       I have personallly reviewed all pertinent lab results from the last 24 hours.    Physical Exam:   Constitutional: She is oriented to person, place, and time. She appears well-developed and well-nourished.    HENT:   Head: Normocephalic.    Eyes: Conjunctivae and EOM are normal.    Neck: Normal range of motion.    Cardiovascular: Normal rate, regular rhythm and normal  heart sounds.  Exam reveals edema.    Moderate +2 LE extremity, denies h/a, blurred vision    Pulmonary/Chest: Effort normal and breath sounds normal.        Abdominal: Soft. Bowel sounds are normal.     Genitourinary: Vagina normal and uterus normal.   Genitourinary Comments: FF @ U, vagina intact, sore           Musculoskeletal: Normal range of motion and moves all extremeties.       Neurological: She is alert and oriented to person, place, and time. She has normal reflexes.    Skin: Skin is warm and dry.    Psychiatric: She has a normal mood and affect. Her behavior is normal.   Mother states she is happy that baby will be coming out of the NICU soon. States is tired, but  and grandparents are coming to help with baby care.

## 2017-05-20 NOTE — PROGRESS NOTES
[facility] -   Obstetrics  Postpartum Progress Note    Patient Name: Venessa Hernandez  MRN: [id number]  Admission Date: 5/18/2017  Hospital Length of Stay: 2 days  Attending Physician: Sho Lowe MD  Primary Care Provider: Marcos Leary MD    Subjective:     Principal Problem:Status post vacuum-assisted vaginal delivery    Hospital Course:  PreE workup negative, BP stable  Induction of labor, equivocal EFM, intermittent decels resolved spontaneously  PPD #1-routine PP care, intact perineum, baby is in NICU for evaluation of TTN  Breastfeeding-breastfeeding in NICU      Interval History:  Venessa Hernandez is a 27 y.o. female status post Spontaneous vaginal delivery on 5/19/17 07:47 AM  at 40w0d. Patient is doing well today. She denies nausea, vomiting, fever or chills.  Patient reports moderate abdominal pain and vaginal soreness that is with difficulty relieved by NSAID oral pain medications. Educated on available PRN pain medication. Patient states she would like a Percocet, given by nurse. Vaginal bleeding is light. Patient is voiding without difficulty and ambulating with no difficulty. She has passed flatus, and has not had BM. Taking stool softeners.    Patient is breastfeeding. Denies need for contraception. She desires circumcision for her male baby: yes.     Objective:     Vital Signs (Most Recent):  Temp: 98.7 °F (37.1 °C) (05/20/17 0800)  Pulse: 71 (05/20/17 0800)  Resp: 20 (05/20/17 0800)  BP: (!) 115/59 (05/20/17 0800)  SpO2: 100 % (05/19/17 0850) Vital Signs (24h Range):  Temp:  [97.7 °F (36.5 °C)-98.7 °F (37.1 °C)] 98.7 °F (37.1 °C)  Pulse:  [68-82] 71  Resp:  [17-20] 20  BP: (105-120)/(55-62) 115/59        There is no height or weight on file to calculate BMI.      Intake/Output Summary (Last 24 hours) at 05/20/17 1119  Last data filed at 05/19/17 2244   Gross per 24 hour   Intake                0 ml   Output             1100 ml   Net            -1100 ml       Significant  Labs:  Lab Results   Component Value Date    GROUPTRH A NEG 2017    HEPBSAG Negative 10/14/2016    STREPBCULT No Group B Streptococcus isolated 2017       Recent Labs  Lab 17  1312   HGB 9.8*   HCT 30.3*       I have personallly reviewed all pertinent lab results from the last 24 hours.    Physical Exam:   Constitutional: She is oriented to person, place, and time. She appears well-developed and well-nourished.    HENT:   Head: Normocephalic.    Eyes: Conjunctivae and EOM are normal.    Neck: Normal range of motion.    Cardiovascular: Normal rate, regular rhythm and normal heart sounds.  Exam reveals edema.    Moderate +2 LE extremity, denies h/a, blurred vision    Pulmonary/Chest: Effort normal and breath sounds normal.        Abdominal: Soft. Bowel sounds are normal.     Genitourinary: Vagina normal and uterus normal.   Genitourinary Comments: FF @ U, vagina intact, sore           Musculoskeletal: Normal range of motion and moves all extremeties.       Neurological: She is alert and oriented to person, place, and time. She has normal reflexes.    Skin: Skin is warm and dry.    Psychiatric: She has a normal mood and affect. Her behavior is normal.   Mother states she is happy that baby will be coming out of the NICU soon. States is tired, but  and grandparents are coming to help with baby care.        Assessment/Plan:     27 y.o. female  at 40w0d for:    No new Assessment & Plan notes have been filed under this hospital service since the last note was generated.  Service: Obstetrics and Gynecology      Disposition: As patient meets milestones, will plan to discharge tomorrow.    Cyndie Howard CNM  Obstetrics  [facility] -

## 2017-05-20 NOTE — PLAN OF CARE
Problem: Patient Care Overview  Goal: Plan of Care Review  Outcome: Ongoing (interventions implemented as appropriate)  Patient stable at this time. No acute distress or pain noted. Breastfeeding infant. Bonding well. Questions encouraged and answered. Safety maintained throughout shift. Will continue to monitor.

## 2017-05-20 NOTE — LACTATION NOTE
Visited mother at bedside. Mother states that breastfeeding is going well; states that she latched Rashid shallow this morning, resulting or a soreness on the left nipple. Denies any needs for assistance at the moment.  Discussed the importance of cue based feedings on demand, unrestricted access to the breast, and frequent uninterrupted skin to skin contact.  Risk and implications of artificial nipples and supplementation discussed.  Encouraged mother to call for assistance when desired or when infant is showing signs of hunger, contact number provided, mother verbalizes understanding.

## 2017-05-20 NOTE — PLAN OF CARE
Problem: Patient Care Overview  Goal: Plan of Care Review  Outcome: Ongoing (interventions implemented as appropriate)  Patient in the NICU breastfeeding baby. Patient walked to NICU by herself. No distress noted.

## 2017-05-21 VITALS
OXYGEN SATURATION: 100 % | DIASTOLIC BLOOD PRESSURE: 68 MMHG | TEMPERATURE: 98 F | RESPIRATION RATE: 18 BRPM | HEART RATE: 70 BPM | SYSTOLIC BLOOD PRESSURE: 120 MMHG

## 2017-05-21 PROBLEM — O26.892 RH NEGATIVE STATUS DURING PREGNANCY IN SECOND TRIMESTER, ANTEPARTUM: Status: RESOLVED | Noted: 2017-02-22 | Resolved: 2017-05-21

## 2017-05-21 PROBLEM — Z67.91 RH NEGATIVE STATUS DURING PREGNANCY IN SECOND TRIMESTER, ANTEPARTUM: Status: RESOLVED | Noted: 2017-02-22 | Resolved: 2017-05-21

## 2017-05-21 PROCEDURE — 25000003 PHARM REV CODE 250: Performed by: OBSTETRICS & GYNECOLOGY

## 2017-05-21 RX ORDER — OXYCODONE AND ACETAMINOPHEN 5; 325 MG/1; MG/1
1 TABLET ORAL EVERY 6 HOURS PRN
Qty: 15 TABLET | Refills: 0 | Status: SHIPPED | OUTPATIENT
Start: 2017-05-21 | End: 2017-05-21

## 2017-05-21 RX ORDER — OXYCODONE AND ACETAMINOPHEN 5; 325 MG/1; MG/1
1 TABLET ORAL EVERY 6 HOURS PRN
Qty: 15 TABLET | Refills: 0 | Status: SHIPPED | OUTPATIENT
Start: 2017-05-21 | End: 2017-06-21

## 2017-05-21 RX ADMIN — DOCUSATE SODIUM 100 MG: 100 CAPSULE, LIQUID FILLED ORAL at 08:05

## 2017-05-21 RX ADMIN — OXYCODONE HYDROCHLORIDE AND ACETAMINOPHEN 1 TABLET: 10; 325 TABLET ORAL at 06:05

## 2017-05-21 RX ADMIN — IBUPROFEN 600 MG: 600 TABLET, FILM COATED ORAL at 06:05

## 2017-05-21 NOTE — DISCHARGE INSTRUCTIONS
Mother Self Care:    Activity: Avoid strenuous exercise and get adequate rest.  No driving until your physician gives you consent.  Emotional Changes: The grieving process has many different stages, be prepared to experience lots of emotional ups and downs. Identify people to be your support system, and do not hesitate to call our  if you need someone to talk to.   Breast Care: You may notice milk leaking from your breasts. Wear a support bra 24 hours a day for one week or wrap breasts in an ace bandage if needed to stop milk production.  Avoid stimulation to breasts.  You may use ice packs for discomfort.  Ventura-Care/Vaginal Bleeding: Remember to use your ventura-bottle after urinating.  Your flow will change from red, to pink, to yellow/white color over a period of 2 weeks.  Menstruation will return in 3-8 weeks.  Episiotomy Vaginal Delivery: Stitches will dissolve within 10 days to 3 weeks.  Warm baths, tucks, and dermoplast spray will promote healing.  Avoid bubble baths or strong soaps.   Section/Tubal Ligation: Keep incision clean and dry.  Please remove steri-strips in 5-7 days.  You may shower, but avoid baths.  Sexual Activity/Pelvic Rest: No sexual activity, tampons, or douching until your physician gives you consent.  Diet: Continue to eat from the five basic food groups, including plenty of protein, fruits, vegetables, and whole grains.  Limit empty calories and high fat foods.  Drink enough fluids to satisfy thirst.  Constipation/Hemorrhoids: Drink plenty of water.  You may take a stool softener or natural laxative (Metamucil). You may use tucks or hemorrhoid ointment and soak in a warm tub.    CALL YOUR OB DOCTOR IF ANY OF THE FOLLOWING OCCURS:  *Heavy bleeding - saturating a pad an hour or passing any large (2-3 inches in size) blood clots.  *Any pain, redness, or tenderness in lower leg.  *You cannot care for yourself  *Any signs of infection-      - Temperature greater than 100.5  degrees F      - Foul smelling vaginal discharge and/or incisional drainage      - Increased episiotomy or incisional pain      - Hot, hard, red or sore area on breast      - Flu-like symptoms      - Any urgency, frequency or burning with urination

## 2017-05-21 NOTE — LACTATION NOTE
Lactation rounds  Mother states that breastfeeding is going well. Soreness on the left nipple noted; mother states that baby did that yesterday coming back from NICU. Reviewed nipple care and hand expression.   Baby is showing feeding cues. Mother independently latched baby on the left breast in a cross cradle hold. Latch is slightly difficult at first, since baby had a circumcision, but mother is able to latch baby after a few attempts.   Lactation discharge information reviewed.  Mother is aware of warm line, and outpatient consultations and monthly support gatherings. Encouraged mother to contact lactation with any questions, concerns, or problems. Contact numbers provided, and mother verbalizes understanding.     05/21/17 1015   Maternal Infant Assessment   Nipple Symptoms left:;abraded   Infant Assessment   Number of Stools (24 hours) 2   Number of Voids (24 hours) 3   LATCH Score   Latch 2-->grasps breast, tongue down, lips flanged, rhythmic sucking   Audible Swallowing 2-->spontaneous and intermittent (24 hrs old)   Type Of Nipple 2-->everted (after stimulation)   Comfort (Breast/Nipple) 2-->soft/nontender   Hold (Positioning) 2-->no assist from staff, mother able to position/hold infant   Score (less than 7 for 2/more consecutive times, consult Lactation Consultant) 10   Maternal Infant Feeding   Maternal Emotional State independent;relaxed   Infant Positioning cross-cradle   Signs of Milk Transfer audible swallow;breasts soften with feeding   Presence of Pain no   Latch Assistance no   Breastfeeding Education adequate infant intake;adequate milk volume;diet;returning to work    Following Delivery no   Feeding Infant   Satiety Cues calm after feeding   Feeding Tolerance/Success adequate pause for breath   Effective Latch During Feeding yes   Audible Swallow yes   Suck/Swallow Coordination present   Lactation Interventions   Attachment Promotion skin-to-skin contact encouraged;rooming-in  promoted;role responsibility promoted;privacy provided;infant-mother separation minimized;family involvement promoted;counseling provided;breastfeeding assistance provided;face-to-face positioning promoted;environment adjusted   Breast Care: Breastfeeding manual expression to soften breast;milk massaged towards nipple   Breastfeeding Assistance assisted with positioning;both breasts offered each feeding;feeding cue recognition promoted;feeding on demand promoted;support offered   Maternal Breastfeeding Support encouragement offered;infant-mother separation minimized;lactation counseling provided   Latch Promotion positioning assisted

## 2017-05-21 NOTE — PLAN OF CARE
Problem: Patient Care Overview  Goal: Plan of Care Review  Outcome: Ongoing (interventions implemented as appropriate)  Patient stable at this time. No acute distress or pain noted. Breastfeeding well. Bonding with infant. Questions encouraged and answered. Safety maintained. Will continue to monitor.

## 2017-05-21 NOTE — PLAN OF CARE
Problem: Patient Care Overview  Goal: Plan of Care Review  Outcome: Outcome(s) achieved Date Met: 05/21/17  Patient to be discharged.

## 2017-05-21 NOTE — HOSPITAL COURSE
PreE workup negative, BP stable  Induction of labor, equivocal EFM, intermittent decels resolved spontaneously    PPD #1-routine PP care, intact perineum, baby is in NICU for evaluation of TTN  Breastfeeding-breastfeeding in NICU    5/21/17--PPD #2, normal PP course, d/c home today

## 2017-05-21 NOTE — DISCHARGE SUMMARY
[facility] -   Obstetrics  Discharge Summary      Patient Name: Venessa Hernandez  MRN: [id number]  Admission Date: 2017  Hospital Length of Stay: 3 days  Discharge Date and Time:  2017 8:42 AM  Attending Physician: Sho Lowe MD   Discharging Provider: Dav Gallegos CNM  Primary Care Provider: Marcos Leary MD    HPI: No notes on file    * No surgery found *     Hospital Course:   PreE workup negative, BP stable  Induction of labor, equivocal EFM, intermittent decels resolved spontaneously    PPD #1-routine PP care, intact perineum, baby is in NICU for evaluation of TTN  Breastfeeding-breastfeeding in NICU    17--PPD #2, normal PP course, d/c home today          Final Active Diagnoses:    Diagnosis Date Noted POA    PRINCIPAL PROBLEM:   (normal spontaneous vaginal delivery) [O80] 2017 Not Applicable    Outcome of delivery, single liveborn [Z37.0] 2017 Not Applicable    Vacuum extractor delivery, delivered [O66.5]  No      Problems Resolved During this Admission:    Diagnosis Date Noted Date Resolved POA    Status post vacuum-assisted vaginal delivery [Z87.42] 2017 Not Applicable    Elevated blood pressure affecting pregnancy in third trimester, antepartum [O13.3] 2017 Yes    Fetal heart rate decelerations affecting management of mother [O76] 2017 Yes            Feeding Method: breast    Immunizations     Date Immunization Status Dose Route/Site Given by    17 0901 MMR Incomplete 0.5 mL Subcutaneous/Left deltoid     17 0901 Tdap Incomplete 0.5 mL Intramuscular/Left deltoid     17 0856 Rho (D) Immune Globulin - IM Incomplete 300 mcg Intramuscular/     17 1055 Rho (D) Immune Globulin - IM Incomplete 300 mcg Intramuscular/           Delivery:    Episiotomy: None   Lacerations: None   Repair suture: None   Repair # of packets:     Blood loss (ml): 300     Birth information:  Date  of birth: 5/19/2018   Time of birth: 7:47 AM   Sex: male   Delivery type: Vaginal, Vacuum (Extractor)   Gestational Age: 40w0d    Delivery Clinician:      Other providers:       Additional  information:  Forceps:    Vacuum:    Breech:    Observed anomalies      Living?:           APGARS  One minute Five minutes Ten minutes   Skin color:         Heart rate:         Grimace:         Muscle tone:         Breathing:         Totals: 8  9        Placenta: Delivered:       appearance    Pending Diagnostic Studies:     None          Discharged Condition: good    Disposition: Home    Follow Up:  Follow-up Information     Follow up In 4 weeks.    Why:  PP               Patient Instructions:   No discharge procedures on file.  Medications:  Current Discharge Medication List      START taking these medications    Details   oxycodone-acetaminophen (PERCOCET) 5-325 mg per tablet Take 1 tablet by mouth every 6 (six) hours as needed.  Qty: 15 tablet, Refills: 0         CONTINUE these medications which have NOT CHANGED    Details   PNV62/FA/OM3/DHA/EPA/FISH OIL (PRENATAL GUMMY ORAL) Take by mouth.         STOP taking these medications       acetaminophen (TYLENOL) 325 MG tablet Comments:   Reason for Stopping:         ranitidine (ZANTAC) 75 MG tablet Comments:   Reason for Stopping:               Dav Gallegos CNM  Obstetrics  [facility]

## 2017-06-21 ENCOUNTER — POSTPARTUM VISIT (OUTPATIENT)
Dept: OBSTETRICS AND GYNECOLOGY | Facility: CLINIC | Age: 27
End: 2017-06-21
Payer: OTHER GOVERNMENT

## 2017-06-21 VITALS
HEIGHT: 61 IN | BODY MASS INDEX: 29.05 KG/M2 | WEIGHT: 153.88 LBS | SYSTOLIC BLOOD PRESSURE: 124 MMHG | DIASTOLIC BLOOD PRESSURE: 62 MMHG

## 2017-06-21 PROCEDURE — 99212 OFFICE O/P EST SF 10 MIN: CPT | Mod: PBBFAC,PO | Performed by: ADVANCED PRACTICE MIDWIFE

## 2017-06-21 PROCEDURE — 99999 PR PBB SHADOW E&M-EST. PATIENT-LVL II: CPT | Mod: PBBFAC,,, | Performed by: ADVANCED PRACTICE MIDWIFE

## 2017-06-21 PROCEDURE — 0503F POSTPARTUM CARE VISIT: CPT | Mod: ,,, | Performed by: ADVANCED PRACTICE MIDWIFE

## 2017-06-21 PROCEDURE — 88175 CYTOPATH C/V AUTO FLUID REDO: CPT

## 2017-06-21 RX ORDER — SERTRALINE HYDROCHLORIDE 50 MG/1
50 TABLET, FILM COATED ORAL DAILY
Qty: 30 TABLET | Refills: 2 | Status: SHIPPED | OUTPATIENT
Start: 2017-06-21 | End: 2020-09-01

## 2017-06-21 RX ORDER — ASCORBIC ACID 1000 MG
175 TABLET ORAL DAILY
COMMUNITY
End: 2020-09-01

## 2017-06-21 NOTE — PATIENT INSTRUCTIONS
After a Vaginal Birth  After having a baby, your body may be very tired. It can take time to recover from a vaginal delivery. You may stay in the hospital or birth center from 1 to 4 days. In some cases, you may be able to go home the same day.    Right after the delivery  Your temperature and blood pressure will be taken until they are stable. A nurse or other healthcare provider will observe you as you rest. You may have afterbirth pains. These are cramps caused by the uterus shrinking. Sanitary pads are used to soak up the discharge of the uterine lining. To make sure that you arent bleeding too much, the pad will be checked. And the firmness of your uterus will be checked. To do this, a nurse will gently push down on your stomach. If you had anesthesia, youll be watched closely until you can feel and move your toes. If you have perineal pain (pain between the vagina and anus), an ice pack can help.   care  While still in the hospital or birth center, youll learn how to hold and feed your baby. You will also be given instructions on how to care for your baby. This includes bathing and feeding.   Preparing to go home  You may be anxious to go home as soon as possible. Before you and your baby go home, a healthcare provider will check to be sure you are healthy enough to take care of your baby and yourself. Youre ready to go home when:  · You can walk to the bathroom and use the bathroom without help.  · You can eat solid food and swallow pills (if needed).  · You have no sign of infection or other health problems, including fever.   · You have adequate pain control.  · Your bleeding isn't excessive.  · You are able to care for your  and are emotionally stable.  Before leaving the hospital or birth center, youll be given written instructions for home self-care after vaginal delivery. Be sure to follow these instructions carefully. If you have questions or concerns, talk about them now.  If you  have stitches  You may have received stitches in the skin near your vagina. The stitches might have closed an episiotomy (an incision that enlarges the opening of the vagina). Or you may have needed stitches to repair torn skin. Either way, your stitches should dissolve within weeks. Until then, you can help reduce discomfort, aid healing, and reduce your risk of infection by keeping the stitches clean. These tips can help:  · Gently wipe from front to back after you urinate or have a bowel movement.  · After wiping, spray warm water on the area. Or you can have a sitz bath. This means sitting in a tub with a few inches of water in it. Then pat the area dry or use a hairdryer on a cool setting.  · Do not use soap or any solution except water on the area.  · You can take a shower unless told not to.  · Change sanitary pads at least every 2 to 4 hours.  · Place cold or heat packs on the area as directed by your healthcare providers or nurses. Keep a thin towel between the pack and your skin.  · Sit on firm seats so the stitches pull less.   follow-up  Schedule a  follow-up exam with your healthcare provider for about 6 weeks after delivery. During this exam, your uterus and vaginal area will be checked. Contact your healthcare provider if you think you or your baby are having any problems.  When to call your healthcare provider  Call your health care provider right away if you have:  · A fever of 100.4°F (38.0°C) or higher  · Bleeding that requires a new sanitary pad after an hour, or large blood clots  · Pain in your vagina that gets worse and isn't relieved with medicine  · Swelling, discharge, or increased pain from vaginal tear or episiotomy  · Burning, pain, red streaks, or lumpy areas in your breasts that may be accompanied by flu-like symptoms  · Cracks, blisters, or blood on your nipples  · Burning or pain when you urinate  · Nausea or vomiting  · Dizziness or fainting  · Feelings of extreme  sadness or anxiety, or a feeling that you dont want to be with your baby  · Abdominal pain that isnt relieved with medicine  · Vaginal discharge that has a bad odor  · No bowel movement for 5 days  · Painful urination, orinability to control urination  · Redness, warmth, or pain in the lower leg  · Chest pain   Date Last Reviewed: 8/2/2015  © 4893-9010 [employer]. [address] [place] [address], [place] [address]. All rights reserved. This information is not intended as a substitute for professional medical care. Always follow your healthcare professional's instructions.        After Delivery: When to Call the Health Care Provider  Health problems sometimes arise with you or your baby following delivery. Call your baby's healthcare provider or your healthcare provider if you see any of the signs below.      Watch your baby for these signs  Call your babys health care provider if your baby:  · Has a rectal temperature of 100.4°F (38°C) or higher  · Has fewer than 6 wet diapers a day (Hint: Disposable diapers may feel heavy or hard after being soaked.)  · Skin or whites of the eyes appear yellow  · Cries for a long time, or if it sounds as if the cries are caused by pain  · Has diarrhea  · Refuses two feedings in a row  · Is inactive or listless  · Is vomiting  · Has blood in the stool or vomit  · Has a rash  · Has ear drainage  · Has difficulty breathing  · Has a seizure  · Will not wake up  Trust your instincts. If you are concerned about your baby, call your health care provider.  Watch your own health for these signs  Call your own health care provider if you have:  · Burning or pain in your breasts  · Red streaks or hard lumpy areas in your breasts  · Problems with breast feeding  · A fever of 100.4°F (38°C) or higher  · Extreme tiredness or body aches, as if you have the flu  · Feelings of extreme sadness or anxiety, or a feeling that you dont want to be with your baby  · Abdominal pain that isnt  relieved with medicine  · Vaginal discharge that has a bad odor  · Vaginal bleeding that soaks more than one pad per hour  · If you had a  section, call for concerns about your incision site such as pain, drainage or bleeding from your incision  Date Last Reviewed: 9/10/2014  © 4446-8402 [employer]. [address] [place] [address], [place] [address]. All rights reserved. This information is not intended as a substitute for professional medical care. Always follow your healthcare professional's instructions.        After Giving Birth: How to Feel Healthy    Helping yourself feel fit is one of the best things you can do for your baby. A little exercise will tone your muscles. Youll feel stronger and more energized. Youll also feel more awake and aware. Dont worry about your weight right now. Your goal is to feel healthy. Part of feeling good is dressing for comfort. If you dress smart, you can be a busy new mom and still look great.  Continue Kegel exercises  You may have been told to do Kegel exercises during pregnancy. These exercises strengthen the muscles that are strained by carrying and delivering the baby. You can return to your Kegels as soon as you feel ready. Why not start today? Squeeze your pelvic floor muscles (the ones that control your urine stream) for at least 5 seconds. Relax, then squeeze again. Work your way up to 50 or 100 Kegels a day.  Exercise often  Exercise helps you get in shape. It also strengthens your muscles, so you are better fit for lifting the baby. As an added benefit, exercise gives you a sense that youre doing something good for yourself. Take your baby for a short walk, or spend 10 minutes stretching. If you were active during pregnancy, you can probably begin light exercise as soon as you feel ready. But be sure to check with your healthcare provider before you begin.  Stay off the scale  For the first month, think about regaining energy and feeling good, not  about losing weight. Losing weight too soon can make you feel more tired. Instead, focus on caring for your baby and eating balanced meals. You may lose some weight without even trying, especially if youre breastfeeding. Once your energy level is back to normal, you can begin to lose weight. A gradual weight loss of 4 or 5 pounds a month is safest.  Pelvic tilt  Lie on your back, with your knees bent and your feet flat on the floor. Now tighten the muscles in your belly and buttocks. As you inhale, press down until your low back flattens against the floor. Hold for a moment, then relax. Repeat 5 times twice a day.  Abdominal curl  Lie on your back with your knees bent and feet flat on the floor. Cross your arms over your chest. Exhale and tighten the muscles in your belly. Gently raise your shoulders off the floor. Hold for 5 counts. Slowly lower your shoulders. Relax, then repeat 3 to 5 times twice a day.  Dress smart  Youll want to be comfortable during the first days after delivery. Wear a robe, pajamas, or sweats -- whatever feels best. Soon you may want to look more like your prepregnant self. Do your hair and wear makeup, if you normally do. A loose-fitting dress may feel good. But do yourself a favor: Dont reach for your jeans. Its likely to be a month or more before you can wear them. If leaking breasts are a problem, put pads inside your bra and dress in layers. If youre breastfeeding, shirts that open in front or pullover tops are good choices. A scarf or shawl can be used as a drape if you breastfeed when others are present.     When to call your healthcare provider  Remember to schedule your postpartum visit. If you delivered by , be seen within 2 weeks. For vaginal delivery, be seen 4 to 6 weeks after the birth. Also, call your healthcare provider if you have heavy bleeding, fever, redness or persistent lump in breasts, unable to void, unable to have a bowel movement after 1 week, severe  pain, or worsening depression.   Date Last Reviewed: 8/16/2015  © 9810-3448 [employer]. [address] [place] [address], [place] [address]. All rights reserved. This information is not intended as a substitute for professional medical care. Always follow your healthcare professional's instructions.        Breast Care After Birth  A few days after your babys birth, your breasts will swell with milk. They are likely to feel tender and heavy. This is normal. To help prevent breast soreness and control irritation, follow these tips:     Moist heat, like a shower, helps to promote the release of breastmilk.   Coping with swelling  Here are tips to cope with swelling:  · Use cold compresses or an ice pack to help reduce the ache or pain.  · Breastfeed often to keep milk from clogging your breast ducts.  · If your nipples are flat from breast swelling, hand express some milk. Squeeze out a few drops of milk by massaging and compressing your breasts.  · If you have swelling with pain or fever, call your healthcare provider.  Preventing sore nipples  Here are tips to prevent sore nipples:  · Make sure baby latches on to your breast correctly. The babys mouth should be opened very wide and your entire areola should be in the baby's mouth.  · You can let milk dry on your nipples. This dried milk can protect the skin on your nipple.  · Do not use alcohol, soap, or scented cleansers on your breasts. These can cause the nipples to dry and crack.  · Do not wear nursing pads that are lined with plastic. They hold in moisture and can cause chapping.  · If you have cracked or bleeding nipples, consult your healthcare provider or a lactation consultant. He or she will make sure that your baby's latch is correct and may suggest topical treatment, like pure lanolin.  Choosing a good bra  Wearing the right-sized bra is especially important now. If a bra is too tight, it may cause a duct in your breast to clog and become  irritated. If possible, have a salesperson help fit you for a new bra. Look for one thats 100% cotton and comfortable. Also, choose a bra with wide straps that wont dig into your back and shoulders. If youre breastfeeding, find a nursing bra that allows you to uncover one breast at a time.  If you are not breastfeeding  Here are tips to avoid discomfort:  · Avoid stimulation of nipples  · Wear a tight-fitting bra  · Apply cold compresses or ice packs for discomfort     Call your healthcare provider   Call your healthcare provider right away if you have any of the following:  · A fever or chills  · Extreme tiredness and body aches, as if you have the flu  · Burning or pain in one or both breasts  · Red streaks on a breast  · Hard or lumpy spots in one or both breasts  · A feeling of warmth or heat in one or both breasts  · Breasts so swollen your baby cannot latch on to the nipples  · Nipples that are cracked or bleeding  · Low milk supply or your milk does not flow freely   Date Last Reviewed: 9/7/2015  © 7920-2319 [employer]. [address] [place] [address]. All rights reserved. This information is not intended as a substitute for professional medical care. Always follow your healthcare professional's instructions.        Kegel Exercises  Kegel exercises dont need special clothing or equipment. Theyre easy to learn and simple to do. And if you do them right, no one can tell youre doing them, so they can be done almost anywhere. Your healthcare provider, nurse, or physical therapist can answer any questions you have and help you get started.    A weak pelvic floor   The pelvic floor muscles may weaken due to aging, pregnancy and vaginal childbirth, injury, surgery, chronic cough, or lack of exercise. If the pelvic floor is weak, your bladder and other pelvic organs may sag out of place. The urethra may also open too easily and allow urine to leak out. Kegel exercises can help you  strengthen your pelvic floor muscles. Then they can better support the pelvic organs and control urine flow.  How Kegel exercises are done  Try each of the Kegel exercises described below. When youre doing them, try not to move your leg, buttock, or stomach muscles.  · Contract as if you were stopping your urine stream. But do it when youre not urinating.  · Tighten your rectum as if trying not to pass gas. Contract your anus, but dont move your buttocks.  · You may place a finger or 2 in the vagina and squeeze your finger with your vagina to learn which muscles to tighten.  Try to hold each Kegel for a slow count to 5. You probably wont be able to hold them for that long at first. But keep practicing. It will get easier as your pelvic floor gets stronger. Eventually, special weights that you place in your vagina may be recommended to help make your Kegels even more effective. Visit your healthcare provider if you have difficulties doing Kegel exercises.  Helpful hints  Here are some tips to follow:  · Do your Kegels as often as you can. The more you do them, the faster youll feel the results.  · Pick an activity you do often as a reminder. For instance, do your Kegels every time you sit down.  · Tighten your pelvic floor before you sneeze, get up from a chair, cough, laugh, or lift. This protects your pelvic floor from injury and can help prevent urine leakage.   Date Last Reviewed: 8/5/2015  © 8784-3860 [employer]. [address] [place] [address], [place] [address]. All rights reserved. This information is not intended as a substitute for professional medical care. Always follow your healthcare professional's instructions.

## 2017-06-21 NOTE — PROGRESS NOTES
"Venessa Hernandez is a 27 y.o. female  presents for a postpartum visit.  She is status post vacumn assisted delivery  4 weeks ago.  Her hospitalization was not complicated.  She is breastfeeding.  She desires natural family planning (NFP) for contraception.  She admits to postpartum depression.Has used Prozac in past and feels"depression" coming on although doing well at present. Feels restarting anti depressive would be helpful    Her last pap was 10/14/16- LGSIL-     Past Medical History:   Diagnosis Date    Abnormal Pap smear of cervix     Anxiety and depression      History reviewed. No pertinent surgical history.  Review of patient's allergies indicates:  No Known Allergies    Current Outpatient Prescriptions:     milk thistle 175 mg tablet, Take 175 mg by mouth once daily., Disp: , Rfl:     PNV62/FA/OM3/DHA/EPA/FISH OIL (PRENATAL GUMMY ORAL), Take by mouth., Disp: , Rfl:       Vitals:    17 0957   BP: 124/62       GENERAL: healthy, alert, no distress, cooperative, smiling  ABDOMEN: no masses, hepatosplenomegaly, no hernias  EXTERNAL GENITALIA POSTPARTUM: normal, well-healed, without lesions or masses   VAGINA POSTPARTUM: normal, well-healed, physiologic discharge, without lesions   CERVIX POSTPARTUM: normal, well-healed, without lesions   UTERUS POSTPARTUM: normal size, well involuted, firm, non-tender, ADNEXA POSTPARTUM: no masses palpable and nontender    Assessment:  Normal postpartum exam  Hx LGSIL  Hx  Depression with 16/ score    Plan:  Pap today  Start Zoloft 50mg and FU 1 month or prn  "

## 2018-09-10 ENCOUNTER — PATIENT MESSAGE (OUTPATIENT)
Dept: OBSTETRICS AND GYNECOLOGY | Facility: CLINIC | Age: 28
End: 2018-09-10

## 2018-09-20 ENCOUNTER — TELEPHONE (OUTPATIENT)
Dept: OBSTETRICS AND GYNECOLOGY | Facility: CLINIC | Age: 28
End: 2018-09-20

## 2018-09-20 NOTE — TELEPHONE ENCOUNTER
Spoke to patient. Informed her that midwives no longer do annuals/paps on non OB patients. Offered to schedule her annual with a GYN doctor, but she informed me that she has to contact her insurance company to get a list of GYN doctors, then get a referral from her PCP.  Patient verbalized understanding.

## 2019-08-10 NOTE — PROGRESS NOTES
[facility]  Obstetrics  Labor Progress Note    Patient Name: Venessa Hernandez  MRN: [id number]  Admission Date: 2017  Hospital Length of Stay: 0 days  Attending Physician: Sho Lowe MD  Primary Care Provider: Marcos Leary MD    Subjective:     Principal Problem:Fetal heart rate decelerations affecting management of mother    Hospital Course:  PreE workup negative, BP stable  Induction of labor, equivocal EFM, intermittent decels resolved spontaneously      Interval History:  Venessa is a 27 y.o.  at 39w6d. She is doing well.     Objective:     Vital Signs (Most Recent):  Temp: 98.8 °F (37.1 °C) (17 1245)  Pulse: 89 (17 1537)  Resp: 18 (17 1245)  BP: (!) 143/79 (17 1533)  SpO2: 99 % (17 1537) Vital Signs (24h Range):  Temp:  [98.8 °F (37.1 °C)] 98.8 °F (37.1 °C)  Pulse:  [] 89  Resp:  [18] 18  SpO2:  [97 %-100 %] 99 %  BP: (112-144)/(53-92) 143/79        There is no height or weight on file to calculate BMI.    FHT: Cat 1 (reassuring)  TOCO: Irregular UC    Cervical Exam: /v/-3 Cooks catheter placed, inflated each bulb with 80 cc sterile water       Significant Labs:  Lab Results   Component Value Date    GROUPTRH A NEG 2017    HEPBSAG Negative 10/14/2016    STREPBCULT No Group B Streptococcus isolated 2017       I have personallly reviewed all pertinent lab results from the last 24 hours.    Physical Exam    Assessment/Plan:     27 y.o. female  at 39w6d for:    * Fetal heart rate decelerations affecting management of mother  Cooks catheter placed, will augment labor, continuous EFM    Elevated blood pressure affecting pregnancy in third trimester, antepartum  Continuous EFM  Negative PreE workup          Miley Hull CNM  Obstetrics  [facility]           English

## 2020-08-20 ENCOUNTER — OFFICE VISIT (OUTPATIENT)
Dept: OTOLARYNGOLOGY | Facility: CLINIC | Age: 30
End: 2020-08-20
Payer: OTHER GOVERNMENT

## 2020-08-20 ENCOUNTER — DOCUMENTATION ONLY (OUTPATIENT)
Dept: NEPHROLOGY | Facility: CLINIC | Age: 30
End: 2020-08-20

## 2020-08-20 VITALS
TEMPERATURE: 98 F | WEIGHT: 135.13 LBS | BODY MASS INDEX: 25.53 KG/M2 | HEART RATE: 98 BPM | SYSTOLIC BLOOD PRESSURE: 114 MMHG | DIASTOLIC BLOOD PRESSURE: 76 MMHG

## 2020-08-20 DIAGNOSIS — J03.90 EXUDATIVE TONSILLITIS: Primary | ICD-10-CM

## 2020-08-20 DIAGNOSIS — J03.01 RECURRENT STREPTOCOCCAL TONSILLITIS: ICD-10-CM

## 2020-08-20 PROCEDURE — 99214 OFFICE O/P EST MOD 30 MIN: CPT | Mod: PBBFAC | Performed by: PHYSICIAN ASSISTANT

## 2020-08-20 PROCEDURE — 87070 CULTURE OTHR SPECIMN AEROBIC: CPT

## 2020-08-20 PROCEDURE — 99204 PR OFFICE/OUTPT VISIT, NEW, LEVL IV, 45-59 MIN: ICD-10-PCS | Mod: S$PBB,,, | Performed by: PHYSICIAN ASSISTANT

## 2020-08-20 PROCEDURE — 99999 PR PBB SHADOW E&M-EST. PATIENT-LVL IV: ICD-10-PCS | Mod: PBBFAC,,, | Performed by: PHYSICIAN ASSISTANT

## 2020-08-20 PROCEDURE — 99204 OFFICE O/P NEW MOD 45 MIN: CPT | Mod: S$PBB,,, | Performed by: PHYSICIAN ASSISTANT

## 2020-08-20 PROCEDURE — 96372 THER/PROPH/DIAG INJ SC/IM: CPT | Mod: PBBFAC

## 2020-08-20 PROCEDURE — 99999 PR PBB SHADOW E&M-EST. PATIENT-LVL IV: CPT | Mod: PBBFAC,,, | Performed by: PHYSICIAN ASSISTANT

## 2020-08-20 RX ORDER — CLINDAMYCIN HYDROCHLORIDE 300 MG/1
300 CAPSULE ORAL EVERY 8 HOURS
Qty: 30 CAPSULE | Refills: 0 | Status: SHIPPED | OUTPATIENT
Start: 2020-08-20 | End: 2020-08-30

## 2020-08-20 RX ORDER — DEXAMETHASONE SODIUM PHOSPHATE 4 MG/ML
12 INJECTION, SOLUTION INTRA-ARTICULAR; INTRALESIONAL; INTRAMUSCULAR; INTRAVENOUS; SOFT TISSUE ONCE
Status: COMPLETED | OUTPATIENT
Start: 2020-08-20 | End: 2020-08-20

## 2020-08-20 RX ORDER — PREDNISOLONE SODIUM PHOSPHATE 15 MG/5ML
30 SOLUTION ORAL DAILY
Qty: 50 ML | Refills: 0 | Status: SHIPPED | OUTPATIENT
Start: 2020-08-20 | End: 2020-08-25

## 2020-08-20 RX ORDER — METOPROLOL SUCCINATE 25 MG/1
25 TABLET, EXTENDED RELEASE ORAL NIGHTLY
COMMUNITY
Start: 2020-07-07 | End: 2021-02-04

## 2020-08-20 RX ORDER — SPIRONOLACTONE 50 MG/1
25 TABLET, FILM COATED ORAL
Status: ON HOLD | COMMUNITY
Start: 2020-03-31 | End: 2022-11-15

## 2020-08-20 RX ORDER — IBUPROFEN 600 MG/1
600 TABLET ORAL EVERY 8 HOURS PRN
COMMUNITY
Start: 2020-08-18 | End: 2020-08-26

## 2020-08-20 RX ORDER — LISDEXAMFETAMINE DIMESYLATE 60 MG/1
60 CAPSULE ORAL EVERY MORNING
COMMUNITY
Start: 2020-08-17 | End: 2020-11-15

## 2020-08-20 RX ORDER — HYDROCODONE BITARTRATE AND ACETAMINOPHEN 7.5; 325 MG/1; MG/1
1 TABLET ORAL EVERY 8 HOURS PRN
COMMUNITY
Start: 2020-08-18 | End: 2020-08-21

## 2020-08-20 RX ORDER — FLUOXETINE 20 MG/1
60 TABLET ORAL NIGHTLY
COMMUNITY
Start: 2020-04-21 | End: 2020-10-18

## 2020-08-20 RX ORDER — ACETAMINOPHEN 325 MG/1
650 TABLET ORAL EVERY 8 HOURS PRN
COMMUNITY
Start: 2020-08-18 | End: 2020-08-26

## 2020-08-20 RX ORDER — RIZATRIPTAN BENZOATE 10 MG/1
TABLET ORAL
Status: ON HOLD | COMMUNITY
Start: 2020-06-12 | End: 2022-11-15

## 2020-08-20 RX ADMIN — DEXAMETHASONE SODIUM PHOSPHATE 12 MG: 4 INJECTION, SOLUTION INTRA-ARTICULAR; INTRALESIONAL; INTRAMUSCULAR; INTRAVENOUS; SOFT TISSUE at 03:08

## 2020-08-20 NOTE — PROGRESS NOTES
Subjective:       Patient ID: Venessa Hernandez is a 30 y.o. female.    Chief Complaint: Sore Throat (Eval For Tonsils)    Patient is a very pleasant 30 year old female here to see me today for the first time for evaluation of tonsillitis.  She started with sore throat about one week ago.  She was seen at  on Sunday 8/16 and swabbed for strep (negative).  She was started on Augmentin and steroids but had worsening throat pain.  She was then seen at ED on 8/18 and told it was likely viral tonsillitis.  Monospot there was negative; she was told to stop antibiotics and steroids which she's done.  She continues to have throat pain with swallowing.  She says her voice seems more muffled.  Denies drooling or respiratory distress.  She is miserable and says she's ready to schedule tonsillectomy.  She has had fever and continues to have white exudate on both tonsils.  She has hx of recurrent strep throat/tonsillitis, at least 3-4 episodes a year for past 5 years but she says it's never hurt this bad.    Review of Systems   Constitutional: Positive for appetite change and fever. Negative for activity change and unexpected weight change.   HENT: Positive for ear pain, sore throat, trouble swallowing (painful) and voice change. Negative for nasal congestion, ear discharge, hearing loss, nosebleeds, postnasal drip, rhinorrhea and sinus pressure/congestion.    Eyes: Negative for discharge.   Respiratory: Negative for cough, shortness of breath and stridor.    Cardiovascular: Negative for chest pain.   Gastrointestinal: Negative for diarrhea, nausea and vomiting.   Musculoskeletal: Negative for neck pain.   Allergic/Immunologic: Negative for food allergies.   Neurological: Negative for light-headedness and headaches.   Hematological: Positive for adenopathy.   Psychiatric/Behavioral: Negative for confusion.         Objective:      Physical Exam  Vitals signs reviewed.   Constitutional:       General: She is not in acute  distress.     Appearance: She is well-developed. She is not ill-appearing or toxic-appearing.   HENT:      Head: Normocephalic and atraumatic.      Right Ear: Hearing, tympanic membrane, ear canal and external ear normal. No middle ear effusion. Tympanic membrane is not erythematous.      Left Ear: Hearing, tympanic membrane, ear canal and external ear normal.  No middle ear effusion. Tympanic membrane is not erythematous.      Nose: Nose normal. No nasal deformity, septal deviation, mucosal edema or rhinorrhea.      Right Sinus: No maxillary sinus tenderness or frontal sinus tenderness.      Left Sinus: No maxillary sinus tenderness or frontal sinus tenderness.      Mouth/Throat:      Mouth: Mucous membranes are moist. Mucous membranes are not pale and not dry.      Dentition: Normal dentition.      Pharynx: Uvula midline. Posterior oropharyngeal erythema present. No oropharyngeal exudate or uvula swelling.      Tonsils: Tonsillar exudate (thick white exudate bilaterally) present. No tonsillar abscesses. 3+ on the right. 3+ on the left.   Eyes:      General: Lids are normal. No scleral icterus.     Extraocular Movements:      Right eye: Normal extraocular motion and no nystagmus.      Left eye: Normal extraocular motion and no nystagmus.      Conjunctiva/sclera: Conjunctivae normal.      Right eye: Right conjunctiva is not injected. No chemosis.     Left eye: Left conjunctiva is not injected. No chemosis.     Pupils: Pupils are equal, round, and reactive to light.   Neck:      Thyroid: No thyroid mass or thyromegaly.      Trachea: Trachea and phonation normal. No tracheal tenderness or tracheal deviation.   Pulmonary:      Effort: Pulmonary effort is normal. No respiratory distress.      Breath sounds: No stridor.   Abdominal:      General: There is no distension.   Lymphadenopathy:      Head:      Right side of head: No submental, submandibular, preauricular or posterior auricular adenopathy.      Left side of  head: No submental, submandibular, preauricular or posterior auricular adenopathy.      Cervical: Cervical adenopathy present.      Left cervical: Superficial cervical adenopathy present.   Skin:     General: Skin is warm and dry.      Findings: No erythema or rash.   Neurological:      Mental Status: She is alert and oriented to person, place, and time.      Cranial Nerves: No cranial nerve deficit.   Psychiatric:         Behavior: Behavior normal. Behavior is cooperative.                   Assessment:       1. Exudative tonsillitis    2. Recurrent streptococcal tonsillitis        Plan:         Throat culture obtained today and we'll call with results once available.  She has had negative Rapid strep screen at  5 days ago and negative Monospot at ED 3 days ago.  Given her exam today, I would recommend she resume antibiotics.  Clindamycin x 10 days sent in to her pharmacy and Decadron IM today in clinic.  Plan to recheck in one week.  Discussed with her the signs of airway compromise and that she should seek immediate medical attention if occurs.  She does meet criteria for tonsillectomy given her hx of recurrent tonsillitis but recommend waiting until acute infection has resolved due to increased risk of hemorrhage.

## 2020-08-20 NOTE — PROGRESS NOTES
12mg dexamethasone given in right ventrogluteal muscle. Pt. Tolerated medication well. Advised to stay in clinic 15 mins to monitor for reactions.

## 2020-08-24 ENCOUNTER — TELEPHONE (OUTPATIENT)
Dept: OTOLARYNGOLOGY | Facility: CLINIC | Age: 30
End: 2020-08-24

## 2020-08-24 LAB — BACTERIA THROAT CULT: NORMAL

## 2020-08-24 NOTE — TELEPHONE ENCOUNTER
----- Message from Miley Quinones MD sent at 8/24/2020  7:49 AM CDT -----  Please let the patient know lara there throat culture is negative for strep, but she should continue with her antibiotics and keep her scheduled followup with Tamela.

## 2020-08-26 ENCOUNTER — OFFICE VISIT (OUTPATIENT)
Dept: OTOLARYNGOLOGY | Facility: CLINIC | Age: 30
End: 2020-08-26
Payer: OTHER GOVERNMENT

## 2020-08-26 VITALS
TEMPERATURE: 98 F | BODY MASS INDEX: 25.08 KG/M2 | HEART RATE: 98 BPM | DIASTOLIC BLOOD PRESSURE: 89 MMHG | SYSTOLIC BLOOD PRESSURE: 134 MMHG | WEIGHT: 132.69 LBS

## 2020-08-26 DIAGNOSIS — J03.01 RECURRENT STREPTOCOCCAL TONSILLITIS: Primary | ICD-10-CM

## 2020-08-26 DIAGNOSIS — J03.90 EXUDATIVE TONSILLITIS: ICD-10-CM

## 2020-08-26 DIAGNOSIS — Z01.818 PRE-OP TESTING: ICD-10-CM

## 2020-08-26 PROCEDURE — 99214 OFFICE O/P EST MOD 30 MIN: CPT | Mod: PBBFAC | Performed by: PHYSICIAN ASSISTANT

## 2020-08-26 PROCEDURE — 99214 PR OFFICE/OUTPT VISIT, EST, LEVL IV, 30-39 MIN: ICD-10-PCS | Mod: S$PBB,,, | Performed by: PHYSICIAN ASSISTANT

## 2020-08-26 PROCEDURE — 99999 PR PBB SHADOW E&M-EST. PATIENT-LVL IV: CPT | Mod: PBBFAC,,, | Performed by: PHYSICIAN ASSISTANT

## 2020-08-26 PROCEDURE — 99999 PR PBB SHADOW E&M-EST. PATIENT-LVL IV: ICD-10-PCS | Mod: PBBFAC,,, | Performed by: PHYSICIAN ASSISTANT

## 2020-08-26 PROCEDURE — 99214 OFFICE O/P EST MOD 30 MIN: CPT | Mod: S$PBB,,, | Performed by: PHYSICIAN ASSISTANT

## 2020-08-26 NOTE — PROGRESS NOTES
Subjective:       Patient ID: Venessa Hernandez is a 30 y.o. female.    Chief Complaint: Follow-up (For Tonsil Eval)    Patient is a very pleasant 30 year old female here to see me today for recheck of her tonsils.  She was first here with me last week with severe exudative tonsillitis.  She had throat culture at that time which was negative.  She had been pretreated with Augmentin; Rapid strep negative at  and negative Monospot in ED.  She was changed to Clindamycin and oral steroids and a Decadron injection was given.  She says she's feeling much better.  No longer with fever; now able to eat solids.  She able to sleep again.  She does not smoke.  She is very interested in scheduling tonsillectomy.   She has hx of recurrent strep throat/tonsillitis, at least 3-4 episodes a year for past 5 years but she says this has been the worst episode thus far.    Review of Systems   Constitutional: Positive for appetite change. Negative for activity change, fever (now resolved) and unexpected weight change.   HENT: Positive for ear pain and sore throat (much improved). Negative for nasal congestion, ear discharge, hearing loss, postnasal drip, rhinorrhea, sinus pressure/congestion, trouble swallowing (now tolerating regular diet) and voice change.    Respiratory: Negative for cough, choking, shortness of breath and stridor.    Gastrointestinal: Negative for nausea and vomiting.   Musculoskeletal: Negative for neck pain.   Allergic/Immunologic: Negative for food allergies.   Neurological: Negative for light-headedness and headaches.   Hematological: Positive for adenopathy.         Objective:      Physical Exam  Vitals signs reviewed.   Constitutional:       General: She is not in acute distress.     Appearance: She is well-developed. She is not ill-appearing or toxic-appearing.   HENT:      Head: Normocephalic and atraumatic.      Right Ear: Hearing, tympanic membrane, ear canal and external ear normal. No middle ear  effusion. Tympanic membrane is not erythematous.      Left Ear: Hearing, tympanic membrane, ear canal and external ear normal.  No middle ear effusion. Tympanic membrane is not erythematous.      Nose: Nose normal. No nasal deformity, septal deviation, mucosal edema or rhinorrhea.      Mouth/Throat:      Mouth: Mucous membranes are moist. Mucous membranes are not pale and not dry.      Dentition: Normal dentition.      Pharynx: Uvula midline. Posterior oropharyngeal erythema present. No oropharyngeal exudate or uvula swelling.      Tonsils: Tonsillar exudate (bilateral (white) but improved from last week) present. No tonsillar abscesses. 3+ on the right. 3+ on the left.   Eyes:      General: Lids are normal. No scleral icterus.     Extraocular Movements:      Right eye: Normal extraocular motion and no nystagmus.      Left eye: Normal extraocular motion and no nystagmus.      Conjunctiva/sclera: Conjunctivae normal.      Right eye: Right conjunctiva is not injected. No chemosis.     Left eye: Left conjunctiva is not injected. No chemosis.     Pupils: Pupils are equal, round, and reactive to light.   Neck:      Thyroid: No thyroid mass or thyromegaly.      Trachea: Trachea and phonation normal. No tracheal tenderness or tracheal deviation.   Pulmonary:      Effort: Pulmonary effort is normal. No respiratory distress.      Breath sounds: No stridor.   Abdominal:      General: There is no distension.   Lymphadenopathy:      Head:      Right side of head: No submental, submandibular, preauricular or posterior auricular adenopathy.      Left side of head: No submental, submandibular, preauricular or posterior auricular adenopathy.      Cervical: Cervical adenopathy present.      Left cervical: Superficial cervical adenopathy present.   Skin:     General: Skin is warm and dry.      Findings: No erythema or rash.   Neurological:      Mental Status: She is alert and oriented to person, place, and time.      Cranial Nerves:  No cranial nerve deficit.   Psychiatric:         Behavior: Behavior normal. Behavior is cooperative.         Assessment:       1. Recurrent streptococcal tonsillitis    2. Exudative tonsillitis    3. Pre-op testing        Plan:         Discussed tonsillectomy, risks and benefits, including a 1% risk of postoperative bleeding.  Patient voices understanding and agrees to proceed. We will schedule surgery in the near future (3 weeks from now to let acute infection resolve).   Recommend she complete Clindamycin as prescribed.  The patient will follow up with me 2-3 weeks after surgery.

## 2020-09-01 ENCOUNTER — HOSPITAL ENCOUNTER (OUTPATIENT)
Dept: PREADMISSION TESTING | Facility: HOSPITAL | Age: 30
Discharge: HOME | End: 2020-09-01
Attending: ORTHOPAEDIC SURGERY
Payer: OTHER GOVERNMENT

## 2020-09-01 VITALS
DIASTOLIC BLOOD PRESSURE: 78 MMHG | RESPIRATION RATE: 16 BRPM | TEMPERATURE: 98 F | OXYGEN SATURATION: 100 % | SYSTOLIC BLOOD PRESSURE: 127 MMHG | HEART RATE: 92 BPM

## 2020-09-01 DIAGNOSIS — F98.8 ATTENTION DEFICIT DISORDER, UNSPECIFIED HYPERACTIVITY PRESENCE: ICD-10-CM

## 2020-09-01 DIAGNOSIS — J03.91 RECURRENT TONSILLITIS: ICD-10-CM

## 2020-09-01 DIAGNOSIS — L70.0 ACNE VULGARIS: ICD-10-CM

## 2020-09-01 DIAGNOSIS — F41.9 ANXIETY: ICD-10-CM

## 2020-09-01 DIAGNOSIS — Z01.818 PREOP TESTING: Primary | ICD-10-CM

## 2020-09-01 PROBLEM — G43.909 MIGRAINE: Status: ACTIVE | Noted: 2020-09-01

## 2020-09-01 PROCEDURE — U0003 INFECTIOUS AGENT DETECTION BY NUCLEIC ACID (DNA OR RNA); SEVERE ACUTE RESPIRATORY SYNDROME CORONAVIRUS 2 (SARS-COV-2) (CORONAVIRUS DISEASE [COVID-19]), AMPLIFIED PROBE TECHNIQUE, MAKING USE OF HIGH THROUGHPUT TECHNOLOGIES AS DESCRIBED BY CMS-2020-01-R: HCPCS

## 2020-09-01 RX ORDER — ACETAMINOPHEN 500 MG
1000 TABLET ORAL
Status: CANCELLED | OUTPATIENT
Start: 2020-09-01 | End: 2020-09-01

## 2020-09-01 RX ORDER — ALPRAZOLAM 0.5 MG/1
0.5 TABLET ORAL ONCE AS NEEDED
Status: CANCELLED | OUTPATIENT
Start: 2020-09-01 | End: 2032-01-29

## 2020-09-01 RX ORDER — FAMOTIDINE 20 MG/1
20 TABLET, FILM COATED ORAL
Status: CANCELLED | OUTPATIENT
Start: 2020-09-01 | End: 2020-09-01

## 2020-09-01 RX ORDER — SCOLOPAMINE TRANSDERMAL SYSTEM 1 MG/1
1 PATCH, EXTENDED RELEASE TRANSDERMAL ONCE
Qty: 1 PATCH | Refills: 0 | Status: SHIPPED | OUTPATIENT
Start: 2020-09-01 | End: 2020-09-01

## 2020-09-01 NOTE — DISCHARGE INSTRUCTIONS
To confirm, Your doctor has instructed you that surgery is scheduled for 9/4/20 *.       Please report to [facility] .  The Pre- admit office will call afternoon prior to surgery with final arrival time    INSTRUCTIONS IMPORTANT!!!   Do not eat, drink, or smoke after 12 midnight-including water. OK to brush teeth, no gum, candy or mints!    ¨ Take only these medicines with a small swallow of water-morning of surgery.  None    Pre operative instructions:  Please review the Pre-Operative Instruction booklet that you were given.        Bathing Instructions--See page 6 in the Pre-operative booklet.      Prevention of surgical site infections:     -Keep incisions clean and dry.   -Do not soak/submerge incisions in water until completely healed.   -Do not apply lotions, powders, creams, or deodorants to site.   -Always make sure hands are cleaned with antibacterial soap/ alcohol-based  prior to touching the surgical site.  (This includes doctors, nurses, staff, and yourself.)    Signs and symptoms:   -Redness and pain around the area where you had surgery   -Drainage of cloudy fluid from your surgical wound   -Fever over 100.4       I have read or had read and explained to me, and understand the above information.  Additional comments or instructions:  Received a copy of Pre-operative instructions booklet, FAQ surgical site infection sheet, and packets of hibiclens (if indicated).

## 2020-09-01 NOTE — ASSESSMENT & PLAN NOTE
The patient is scheduled for a Tonsillectomy 9/4/20 by Dr. Quinones     Known risk factors for perioperative complications:     Anxiety    Difficulty with intubation is not anticipated.    Cardiac Risk Estimation: low intraoperative cardiac risk    1.) Preoperative workup as follows: hemoglobin, hematocrit, electrolytes, creatinine, glucose.  2.) Change in medication regimen before surgery: d/c Vyvanse preoperatively if able   3.) Prophylaxis for cardiac events with perioperative beta-blockers: Cont Toprol nightly .  4.) Invasive hemodynamic monitoring perioperatively: not indicated.  5.) Deep vein thrombosis prophylaxis postoperatively: regimen to be chosen by surgical team.  6.) Surveillance for postoperative MI with ECG immediately postoperatively and on postoperati ve days 1 and 2 AND troponin levels 24 hours postoperatively and on day 4 or hospital discharge (whichever comes first): not indicated.  7.) Current medications which may produce withdrawal symptoms if withheld perioperatively: none   8.) Other measures: Preoperative alcohol abstention.

## 2020-09-01 NOTE — ASSESSMENT & PLAN NOTE
Pt reports panic attacks with palpitations   Symptoms more controlled with her medication regimine  Cont Fluoxetine nightly and Toprol nightly

## 2020-09-01 NOTE — H&P
Preoperative History and Physical                                                             Hospital Medicine      Chief Complaint: Preoperative evaluation     History of Present Illness:      Venessa Hernandez is a 30 y.o. female who presents to the office today for a preoperative consultation at the request of Dr. Quinones who plans on performing Tonsillectomy on 9/4/20    Functional Status:      The patient is able to climb a flight of stairs. The patient is able to ambulate over 3 blocks without difficulty. The patient's functional status is not affected by the surgical problem. The patient's functional status is not affected by shortness of breath, chest pain, dyspnea on exertion and fatigue.    MET score greater than 4    Past Medical History:      Past Medical History:   Diagnosis Date    Abnormal Pap smear of cervix     ADD (attention deficit disorder)     Anxiety and depression         Past Surgical History:      Past Surgical History:   Procedure Laterality Date    AUGMENTATION OF BREAST          Social History:      Social History     Socioeconomic History    Marital status:      Spouse name: Not on file    Number of children: Not on file    Years of education: Not on file    Highest education level: Not on file   Occupational History    Not on file   Social Needs    Financial resource strain: Not on file    Food insecurity     Worry: Not on file     Inability: Not on file    Transportation needs     Medical: Not on file     Non-medical: Not on file   Tobacco Use    Smoking status: Never Smoker    Smokeless tobacco: Never Used   Substance and Sexual Activity    Alcohol use: Yes     Frequency: 2-3 times a week     Drinks per session: 1 or 2     Comment: social    Drug use: No    Sexual activity: Not Currently     Partners: Male     Birth control/protection: None   Lifestyle    Physical activity     Days per week: Not on file      Minutes per session: Not on file    Stress: Not on file   Relationships    Social connections     Talks on phone: Not on file     Gets together: Not on file     Attends [employer] service: Not on file     Active member of club or organization: Not on file     Attends meetings of clubs or organizations: Not on file     Relationship status: Not on file   Other Topics Concern    Not on file   Social History Narrative    Not on file        Family History:      Family History   Problem Relation Age of Onset    No Known Problems Mother     Hypertension Father     No Known Problems Sister     Asthma Brother     Breast cancer Neg Hx     Colon cancer Neg Hx     Ovarian cancer Neg Hx        Allergies:      Review of patient's allergies indicates:  No Known Allergies    Medications:      Current Outpatient Medications   Medication Sig    FLUoxetine 20 MG tablet Take 60 mg by mouth every evening.     lisdexamfetamine (VYVANSE) 60 MG capsule Take 60 mg by mouth every morning.     metoprolol succinate (TOPROL-XL) 25 MG 24 hr tablet Take 25 mg by mouth every evening.     rizatriptan (MAXALT) 10 MG tablet Take 1 tablet by mouth once as needed for Migraine (Max dose 3 tablets in 24 hours or 5 in one week.). May repeat in 2 hours if needed    spironolactone (ALDACTONE) 50 MG tablet TAKE TWO TABLETS BY MOUTH in the evening.    scopolamine (TRANSDERM-SCOP) 1.3-1.5 mg (1 mg over 3 days) Place 1 patch onto the skin once. Apply one patch behind ear the night prior to surgery for 1 dose     No current facility-administered medications for this encounter.        Vitals:      Vitals:    09/01/20 0928   BP: 127/78   Pulse: 92   Resp: 16   Temp: 97.7 °F (36.5 °C)       Review of Systems:        Constitutional: Negative for fever, chills, weight loss, malaise/fatigue and diaphoresis.   HENT:+throat pain, frequent tonsil stones and throat infections  Negative for hearing loss, ear pain, nosebleeds, congestion,  neck pain,  tinnitus and ear discharge.    Eyes: Negative for blurred vision, double vision, photophobia, pain, discharge and redness.   Respiratory: Negative for cough, hemoptysis, sputum production, shortness of breath, wheezing and stridor.    Cardiovascular: Negative for chest pain, palpitations, orthopnea, claudication, leg swelling and PND.   Gastrointestinal: Negative for heartburn, nausea, vomiting, abdominal pain, diarrhea, constipation, blood in stool and melena.   Genitourinary: Negative for dysuria, urgency, frequency, hematuria and flank pain.   Musculoskeletal: Negative for myalgias, back pain, joint pain and falls.   Skin: Negative for itching and rash.   Neurological: + infrequent migraines Negative for dizziness, tingling, tremors, sensory change, speech change, focal weakness, seizures, loss of consciousness, weakness and   Endo/Heme/Allergies: Negative for environmental allergies and polydipsia. Does not bruise/bleed easily.   Psychiatric/Behavioral: +depression/anxiety, Panic attacks with palpitations Negative for suicidal ideas, hallucinations, memory loss and substance abuse.   All 14 systems reviewed and negative except as noted above.    Physical Exam:      Constitutional: Appears well-developed, well-nourished and in no acute distress.  Patient is oriented to person, place, and time.   Head: Normocephalic and atraumatic. Mucous membranes moist.  Neck: Neck supple no mass.   Cardiovascular: Normal rate and regular rhythm.  S1 S2 appreciated by ascultation.  Pulmonary/Chest: Effort normal and clear to auscultation bilaterally. No respiratory distress.   Abdomen: Soft. Non-tender and non-distended. Bowel sounds are normal.   Neurological: Patient is alert and oriented to person, place and time. Moves all extremities.  Skin: Warm and dry. No lesions.  Extremities: No clubbing, cyanosis or edema.    Laboratory data:      Reviewed and noted in plan where applicable. Please see chart for full laboratory  data.    No results for input(s): CPK, CPKMB, TROPONINI, MB in the last 24 hours. No results for input(s): POCTGLUCOSE in the last 24 hours.     No results found for: INR, PROTIME    Lab Results   Component Value Date    WBC 12.47 05/18/2017    HGB 9.8 (L) 05/18/2017    HCT 30.3 (L) 05/18/2017    MCV 81 (L) 05/18/2017     05/18/2017       No results for input(s): GLU, NA, K, CL, CO2, BUN, CREATININE, CALCIUM, MG in the last 24 hours.    Predictors of intubation difficulty:       Morbid obesity? no   Anatomically abnormal facies? no   Prominent incisors? no   Receding mandible? no   Short, thick neck? no   Neck range of motion: normal   Dentition: No chipped, loose, or missing teeth.    Cardiographics:      None required   Imaging:    None required     Assessment and Plan:      Recurrent tonsillitis  The patient is scheduled for a Tonsillectomy 9/4/20 by Dr. Quinones     Known risk factors for perioperative complications:     Anxiety/Panic attacks   Easily nauseated    Difficulty with intubation is not anticipated.    Cardiac Risk Estimation: low intraoperative cardiac risk    1.) Preoperative workup as follows: hemoglobin, hematocrit, electrolytes, creatinine, glucose.  2.) Change in medication regimen before surgery: d/c Vyvanse preoperatively if able   3.) Prophylaxis for cardiac events with perioperative beta-blockers: Cont Toprol nightly .  4.) Invasive hemodynamic monitoring perioperatively: not indicated.  5.) Deep vein thrombosis prophylaxis postoperatively: regimen to be chosen by surgical team.  6.) Surveillance for postoperative MI with ECG immediately postoperatively and on postoperati ve days 1 and 2 AND troponin levels 24 hours postoperatively and on day 4 or hospital discharge (whichever comes first): not indicated.  7.) Current medications which may produce withdrawal symptoms if withheld perioperatively: none   8.) Other measures: Preoperative alcohol abstention.    Anxiety  Pt reports panic  attacks with palpitations   Symptoms more controlled with her current medication regimen   Cont Fluoxetine nightly and Toprol nightly     ADD (attention deficit disorder)  Instructed pt to hold Vyvanse preoperatively if able     Migraine  Cont Maxalt prn     Acne vulgaris  Cont Spironolactone nightly     Motion sickness  Pt reports she is easily nauseated and easily motion sick   Scopolamine patch prescribed with directions

## 2020-09-02 ENCOUNTER — ANESTHESIA EVENT (OUTPATIENT)
Dept: SURGERY | Facility: HOSPITAL | Age: 30
End: 2020-09-02
Payer: OTHER GOVERNMENT

## 2020-09-02 LAB — SARS-COV-2 RNA RESP QL NAA+PROBE: NOT DETECTED

## 2020-09-02 NOTE — ANESTHESIA PREPROCEDURE EVALUATION
09/02/2020  Venessa Hernandez is a 30 y.o., female.    Anesthesia Evaluation    I have reviewed the Patient Summary Reports.    I have reviewed the Nursing Notes. I have reviewed the NPO Status.   I have reviewed the Medications.     Review of Systems  Anesthesia Hx:  No problems with previous Anesthesia  Denies Family Hx of Anesthesia complications.   Denies Personal Hx of Anesthesia complications.   Social:  Non-Smoker, Social Alcohol Use    Hematology/Oncology:  Hematology Normal        Cardiovascular:  Cardiovascular Normal     Pulmonary:  Pulmonary Normal    Renal/:  Renal/ Normal     Hepatic/GI:  Hepatic/GI Normal    Neurological:   Headaches    Psych:   anxiety depression Panic attacks, takes sedatives + beta blockers.         Physical Exam  General:  Well nourished    Airway/Jaw/Neck:  Airway Findings: Mouth Opening: Normal Tongue: Normal  General Airway Assessment: Adult  Mallampati: II  TM Distance: Normal, at least 6 cm  Jaw/Neck Findings:  Neck ROM: Normal ROM  Neck Findings:     Eyes/Ears/Nose:  Eyes/Ears/Nose Findings:    Dental:  Dental Findings: In tact   Chest/Lungs:  Chest/Lungs Findings: Clear to auscultation, Normal Respiratory Rate     Heart/Vascular:  Heart Findings: Rate: Normal  Rhythm: Regular Rhythm  Sounds: Normal  Heart murmur: negative Vascular Findings: Normal    Abdomen:  Abdomen Findings: Normal    Musculoskeletal:  Musculoskeletal Findings: Normal   Skin:  Skin Findings: Normal    Mental Status:  Mental Status Findings:  Alert and Oriented         Anesthesia Plan  Type of Anesthesia, risks & benefits discussed:  Anesthesia Type:  general  Patient's Preference:   Intra-op Monitoring Plan: standard ASA monitors  Intra-op Monitoring Plan Comments:   Post Op Pain Control Plan: per primary service following discharge from PACU and multimodal analgesia  Post Op Pain Control  Plan Comments:   Induction:   IV  Beta Blocker:  Patient is on a Beta-Blocker and has received one dose within the past 24 hours (No further documentation required).       Informed Consent: Patient understands risks and agrees with Anesthesia plan.  Questions answered. Anesthesia consent signed with patient.  ASA Score: 2     Day of Surgery Review of History & Physical:    H&P update referred to the surgeon.         Ready For Surgery From Anesthesia Perspective.

## 2020-09-03 ENCOUNTER — TELEPHONE (OUTPATIENT)
Dept: PREADMISSION TESTING | Facility: HOSPITAL | Age: 30
End: 2020-09-03

## 2020-09-03 NOTE — TELEPHONE ENCOUNTER
Arrival time 0730 @ [facility]. NPO status reinforced. Medications reviewed. Visitor policy discussed.

## 2020-09-04 ENCOUNTER — HOSPITAL ENCOUNTER (OUTPATIENT)
Facility: HOSPITAL | Age: 30
Discharge: HOME | End: 2020-09-04
Attending: ORTHOPAEDIC SURGERY | Admitting: ORTHOPAEDIC SURGERY
Payer: OTHER GOVERNMENT

## 2020-09-04 ENCOUNTER — ANESTHESIA (OUTPATIENT)
Dept: SURGERY | Facility: HOSPITAL | Age: 30
End: 2020-09-04
Payer: OTHER GOVERNMENT

## 2020-09-04 VITALS
SYSTOLIC BLOOD PRESSURE: 119 MMHG | HEART RATE: 83 BPM | BODY MASS INDEX: 27.06 KG/M2 | OXYGEN SATURATION: 100 % | RESPIRATION RATE: 20 BRPM | HEIGHT: 61 IN | DIASTOLIC BLOOD PRESSURE: 68 MMHG | TEMPERATURE: 98 F | WEIGHT: 143.31 LBS

## 2020-09-04 DIAGNOSIS — Z01.818 PREOP TESTING: ICD-10-CM

## 2020-09-04 DIAGNOSIS — J03.01 RECURRENT STREPTOCOCCAL TONSILLITIS: ICD-10-CM

## 2020-09-04 DIAGNOSIS — J03.90 EXUDATIVE TONSILLITIS: Primary | ICD-10-CM

## 2020-09-04 LAB
B-HCG UR QL: NEGATIVE
CTP QC/QA: YES

## 2020-09-04 PROCEDURE — 88304 PR  SURG PATH,LEVEL III: ICD-10-PCS | Mod: 26,,, | Performed by: PATHOLOGY

## 2020-09-04 PROCEDURE — 42826 PR REMOVAL OF TONSILS,12+ Y/O: ICD-10-PCS | Mod: ,,, | Performed by: ORTHOPAEDIC SURGERY

## 2020-09-04 PROCEDURE — 25000003 PHARM REV CODE 250: Performed by: NURSE PRACTITIONER

## 2020-09-04 PROCEDURE — 81025 URINE PREGNANCY TEST: CPT | Performed by: NURSE PRACTITIONER

## 2020-09-04 PROCEDURE — 00170 ANES INTRAORAL PX NOS: CPT | Performed by: ORTHOPAEDIC SURGERY

## 2020-09-04 PROCEDURE — 88304 TISSUE EXAM BY PATHOLOGIST: CPT | Performed by: PATHOLOGY

## 2020-09-04 PROCEDURE — 25000003 PHARM REV CODE 250: Performed by: NURSE ANESTHETIST, CERTIFIED REGISTERED

## 2020-09-04 PROCEDURE — 63600175 PHARM REV CODE 636 W HCPCS: Performed by: NURSE ANESTHETIST, CERTIFIED REGISTERED

## 2020-09-04 PROCEDURE — 37000009 HC ANESTHESIA EA ADD 15 MINS: Performed by: ORTHOPAEDIC SURGERY

## 2020-09-04 PROCEDURE — 27201423 OPTIME MED/SURG SUP & DEVICES STERILE SUPPLY: Performed by: ORTHOPAEDIC SURGERY

## 2020-09-04 PROCEDURE — D9220A PRA ANESTHESIA: ICD-10-PCS | Mod: ANES,,, | Performed by: ANESTHESIOLOGY

## 2020-09-04 PROCEDURE — 42826 REMOVAL OF TONSILS: CPT | Mod: ,,, | Performed by: ORTHOPAEDIC SURGERY

## 2020-09-04 PROCEDURE — 88304 TISSUE EXAM BY PATHOLOGIST: CPT | Mod: 26,,, | Performed by: PATHOLOGY

## 2020-09-04 PROCEDURE — 63600175 PHARM REV CODE 636 W HCPCS: Performed by: ANESTHESIOLOGY

## 2020-09-04 PROCEDURE — D9220A PRA ANESTHESIA: ICD-10-PCS | Mod: CRNA,,, | Performed by: NURSE ANESTHETIST, CERTIFIED REGISTERED

## 2020-09-04 PROCEDURE — D9220A PRA ANESTHESIA: Mod: CRNA,,, | Performed by: NURSE ANESTHETIST, CERTIFIED REGISTERED

## 2020-09-04 PROCEDURE — 71000015 HC POSTOP RECOV 1ST HR: Performed by: ORTHOPAEDIC SURGERY

## 2020-09-04 PROCEDURE — D9220A PRA ANESTHESIA: Mod: ANES,,, | Performed by: ANESTHESIOLOGY

## 2020-09-04 PROCEDURE — 37000008 HC ANESTHESIA 1ST 15 MINUTES: Performed by: ORTHOPAEDIC SURGERY

## 2020-09-04 PROCEDURE — 71000033 HC RECOVERY, INTIAL HOUR: Performed by: ORTHOPAEDIC SURGERY

## 2020-09-04 PROCEDURE — 36000707: Performed by: ORTHOPAEDIC SURGERY

## 2020-09-04 PROCEDURE — 36000706: Performed by: ORTHOPAEDIC SURGERY

## 2020-09-04 RX ORDER — HYDROCODONE BITARTRATE AND ACETAMINOPHEN 5; 325 MG/1; MG/1
1 TABLET ORAL
Status: DISCONTINUED | OUTPATIENT
Start: 2020-09-04 | End: 2020-09-04 | Stop reason: HOSPADM

## 2020-09-04 RX ORDER — NEOSTIGMINE METHYLSULFATE 1 MG/ML
INJECTION, SOLUTION INTRAVENOUS
Status: DISCONTINUED | OUTPATIENT
Start: 2020-09-04 | End: 2020-09-04

## 2020-09-04 RX ORDER — LIDOCAINE HYDROCHLORIDE 20 MG/ML
INJECTION, SOLUTION EPIDURAL; INFILTRATION; INTRACAUDAL; PERINEURAL
Status: DISCONTINUED | OUTPATIENT
Start: 2020-09-04 | End: 2020-09-04

## 2020-09-04 RX ORDER — MIDAZOLAM HYDROCHLORIDE 1 MG/ML
INJECTION INTRAMUSCULAR; INTRAVENOUS
Status: DISCONTINUED | OUTPATIENT
Start: 2020-09-04 | End: 2020-09-04

## 2020-09-04 RX ORDER — DEXAMETHASONE SODIUM PHOSPHATE 4 MG/ML
INJECTION, SOLUTION INTRA-ARTICULAR; INTRALESIONAL; INTRAMUSCULAR; INTRAVENOUS; SOFT TISSUE
Status: DISCONTINUED | OUTPATIENT
Start: 2020-09-04 | End: 2020-09-04

## 2020-09-04 RX ORDER — PROPOFOL 10 MG/ML
VIAL (ML) INTRAVENOUS
Status: DISCONTINUED | OUTPATIENT
Start: 2020-09-04 | End: 2020-09-04

## 2020-09-04 RX ORDER — FENTANYL CITRATE 50 UG/ML
INJECTION, SOLUTION INTRAMUSCULAR; INTRAVENOUS
Status: DISCONTINUED | OUTPATIENT
Start: 2020-09-04 | End: 2020-09-04

## 2020-09-04 RX ORDER — PROMETHAZINE HYDROCHLORIDE 6.25 MG/5ML
25 SYRUP ORAL EVERY 6 HOURS PRN
Qty: 240 ML | Refills: 0 | Status: SHIPPED | OUTPATIENT
Start: 2020-09-04 | End: 2021-02-04

## 2020-09-04 RX ORDER — HYDROCODONE BITARTRATE AND ACETAMINOPHEN 7.5; 325 MG/15ML; MG/15ML
20 SOLUTION ORAL EVERY 4 HOURS PRN
Qty: 473 ML | Refills: 0 | Status: SHIPPED | OUTPATIENT
Start: 2020-09-04 | End: 2020-09-08 | Stop reason: SDUPTHER

## 2020-09-04 RX ORDER — SODIUM CHLORIDE, SODIUM LACTATE, POTASSIUM CHLORIDE, CALCIUM CHLORIDE 600; 310; 30; 20 MG/100ML; MG/100ML; MG/100ML; MG/100ML
INJECTION, SOLUTION INTRAVENOUS CONTINUOUS
Status: ACTIVE | OUTPATIENT
Start: 2020-09-04

## 2020-09-04 RX ORDER — HYDROCODONE BITARTRATE AND ACETAMINOPHEN 5; 325 MG/1; MG/1
1 TABLET ORAL EVERY 4 HOURS PRN
Status: DISCONTINUED | OUTPATIENT
Start: 2020-09-04 | End: 2020-09-04 | Stop reason: HOSPADM

## 2020-09-04 RX ORDER — ROCURONIUM BROMIDE 10 MG/ML
INJECTION, SOLUTION INTRAVENOUS
Status: DISCONTINUED | OUTPATIENT
Start: 2020-09-04 | End: 2020-09-04

## 2020-09-04 RX ORDER — ONDANSETRON 2 MG/ML
4 INJECTION INTRAMUSCULAR; INTRAVENOUS ONCE AS NEEDED
Status: DISCONTINUED | OUTPATIENT
Start: 2020-09-04 | End: 2020-09-04 | Stop reason: HOSPADM

## 2020-09-04 RX ORDER — ACETAMINOPHEN 500 MG
1000 TABLET ORAL
Status: COMPLETED | OUTPATIENT
Start: 2020-09-04 | End: 2020-09-04

## 2020-09-04 RX ORDER — FENTANYL CITRATE 50 UG/ML
25 INJECTION, SOLUTION INTRAMUSCULAR; INTRAVENOUS EVERY 5 MIN PRN
Status: DISCONTINUED | OUTPATIENT
Start: 2020-09-04 | End: 2020-09-04 | Stop reason: HOSPADM

## 2020-09-04 RX ORDER — FAMOTIDINE 20 MG/1
20 TABLET, FILM COATED ORAL
Status: COMPLETED | OUTPATIENT
Start: 2020-09-04 | End: 2020-09-04

## 2020-09-04 RX ORDER — MEPERIDINE HYDROCHLORIDE 25 MG/ML
12.5 INJECTION INTRAMUSCULAR; INTRAVENOUS; SUBCUTANEOUS ONCE
Status: DISCONTINUED | OUTPATIENT
Start: 2020-09-04 | End: 2020-09-04 | Stop reason: HOSPADM

## 2020-09-04 RX ORDER — DIPHENHYDRAMINE HYDROCHLORIDE 50 MG/ML
25 INJECTION INTRAMUSCULAR; INTRAVENOUS EVERY 6 HOURS PRN
Status: DISCONTINUED | OUTPATIENT
Start: 2020-09-04 | End: 2020-09-04 | Stop reason: HOSPADM

## 2020-09-04 RX ORDER — ONDANSETRON HYDROCHLORIDE 2 MG/ML
INJECTION, SOLUTION INTRAMUSCULAR; INTRAVENOUS
Status: DISCONTINUED | OUTPATIENT
Start: 2020-09-04 | End: 2020-09-04

## 2020-09-04 RX ORDER — ALBUTEROL SULFATE 0.83 MG/ML
2.5 SOLUTION RESPIRATORY (INHALATION) EVERY 4 HOURS PRN
Status: DISCONTINUED | OUTPATIENT
Start: 2020-09-04 | End: 2020-09-04 | Stop reason: HOSPADM

## 2020-09-04 RX ORDER — ALPRAZOLAM 0.5 MG/1
0.5 TABLET ORAL ONCE AS NEEDED
Status: DISCONTINUED | OUTPATIENT
Start: 2020-09-04 | End: 2020-09-04 | Stop reason: HOSPADM

## 2020-09-04 RX ADMIN — GLYCOPYRROLATE 0.6 MG: 0.2 INJECTION, SOLUTION INTRAMUSCULAR; INTRAVITREAL at 10:09

## 2020-09-04 RX ADMIN — ACETAMINOPHEN 1000 MG: 500 TABLET ORAL at 08:09

## 2020-09-04 RX ADMIN — FENTANYL CITRATE 100 MCG: 50 INJECTION, SOLUTION INTRAMUSCULAR; INTRAVENOUS at 09:09

## 2020-09-04 RX ADMIN — NEOSTIGMINE METHYLSULFATE 1 MG: 1 INJECTION INTRAVENOUS at 10:09

## 2020-09-04 RX ADMIN — PROPOFOL 150 MG: 10 INJECTION, EMULSION INTRAVENOUS at 09:09

## 2020-09-04 RX ADMIN — FAMOTIDINE 20 MG: 20 TABLET ORAL at 08:09

## 2020-09-04 RX ADMIN — LIDOCAINE HYDROCHLORIDE 50 MG: 20 INJECTION, SOLUTION EPIDURAL; INFILTRATION; INTRACAUDAL; PERINEURAL at 09:09

## 2020-09-04 RX ADMIN — SODIUM CHLORIDE, SODIUM LACTATE, POTASSIUM CHLORIDE, AND CALCIUM CHLORIDE: .6; .31; .03; .02 INJECTION, SOLUTION INTRAVENOUS at 08:09

## 2020-09-04 RX ADMIN — FENTANYL CITRATE 50 MCG: 50 INJECTION, SOLUTION INTRAMUSCULAR; INTRAVENOUS at 10:09

## 2020-09-04 RX ADMIN — DEXAMETHASONE SODIUM PHOSPHATE 8 MG: 4 INJECTION, SOLUTION INTRA-ARTICULAR; INTRALESIONAL; INTRAMUSCULAR; INTRAVENOUS; SOFT TISSUE at 10:09

## 2020-09-04 RX ADMIN — GLYCOPYRROLATE 0.2 MG: 0.2 INJECTION, SOLUTION INTRAMUSCULAR; INTRAVITREAL at 10:09

## 2020-09-04 RX ADMIN — DEXTROSE 2 G: 50 INJECTION, SOLUTION INTRAVENOUS at 09:09

## 2020-09-04 RX ADMIN — MIDAZOLAM HYDROCHLORIDE 2 MG: 1 INJECTION INTRAMUSCULAR; INTRAVENOUS at 09:09

## 2020-09-04 RX ADMIN — NEOSTIGMINE METHYLSULFATE 4 MG: 1 INJECTION INTRAVENOUS at 10:09

## 2020-09-04 RX ADMIN — ROCURONIUM BROMIDE 30 MG: 10 INJECTION, SOLUTION INTRAVENOUS at 09:09

## 2020-09-04 RX ADMIN — ONDANSETRON HYDROCHLORIDE 4 MG: 2 INJECTION, SOLUTION INTRAMUSCULAR; INTRAVENOUS at 10:09

## 2020-09-04 NOTE — INTERVAL H&P NOTE
The patient has been examined and the H&P has been reviewed:    I concur with the findings and no changes have occurred since H&P was written.    Past Medical History:   Diagnosis Date    Abnormal Pap smear of cervix     ADD (attention deficit disorder)     Anxiety and depression      Past Surgical History:   Procedure Laterality Date    AUGMENTATION OF BREAST       Family History   Problem Relation Age of Onset    No Known Problems Mother     Hypertension Father     No Known Problems Sister     Asthma Brother     Breast cancer Neg Hx     Colon cancer Neg Hx     Ovarian cancer Neg Hx        Review of patient's allergies indicates:  No Known Allergies      Surgery risks, benefits and alternative options discussed and understood by patient/family.          There are no hospital problems to display for this patient.

## 2020-09-04 NOTE — ANESTHESIA PROCEDURE NOTES
Intubation  Performed by: Wanda Barnett CRNA  Authorized by: Toshia Galdamez MD     Intubation:     Induction:  Intravenous    Intubated:  Postinduction    Attempts:  1    Attempted By:  CRNA    Method of Intubation:  Direct    Blade:  Gould 2    Laryngeal View Grade: Grade I - full view of chords      Difficult Airway Encountered?: No      Complications:  None    Airway Device:  Oral endotracheal tube    Airway Device Size:  6.5    Style/Cuff Inflation:  Cuffed    Inflation Amount (mL):  8    Tube secured:  20    Secured at:  The teeth    Placement Verified By:  Capnometry    Complicating Factors:  None    Findings Post-Intubation:  BS equal bilateral and atraumatic/condition of teeth unchanged

## 2020-09-04 NOTE — PLAN OF CARE
Miley/Pre-op nurse took patient's cell phone & locked it up in locker with her other belongings just prior to OR team taking patient to OR.

## 2020-09-04 NOTE — BRIEF OP NOTE
[facility]  Brief Operative Note     SUMMARY     Surgery Date: 9/4/2020     Surgeon(s) and Role:     * Miley Quinones MD - Primary    Assisting Surgeon: None    Pre-op Diagnosis:  Recurrent streptococcal tonsillitis [J03.01]  Exudative tonsillitis [J03.90]    Post-op Diagnosis:  Post-Op Diagnosis Codes:     * Recurrent streptococcal tonsillitis [J03.01]     * Exudative tonsillitis [J03.90]    Procedure(s) (LRB):  TONSILLECTOMY (Bilateral)    Anesthesia: Choice    Findings/Key Components:  3+ cryptic tonsils    Estimated Blood Loss: 5 mL         Specimens:   Specimen (12h ago, onward)    None          Discharge Note    SUMMARY     Admit Date: 9/4/2020    Discharge Date and Time: No discharge date for patient encounter.    Attending Physician: Miley Quinones MD     Discharge Provider: Miley Quinones    Final Diagnosis: Post-Op Diagnosis Codes:     * Recurrent streptococcal tonsillitis [J03.01]     * Exudative tonsillitis [J03.90]    Disposition: Home or Self Care, discharged in good condition    Follow Up/Patient Instructions:       Medications:  Reconciled Home Medications:   Current Discharge Medication List      START taking these medications    Details   hydrocodone-acetaminophen (HYCET) solution 7.5-325 mg/15mL Take 20 mLs by mouth every 4 (four) hours as needed for Pain.  Qty: 473 mL, Refills: 0    Comments: Quantity prescribed more than 7 day supply? No      promethazine (PHENERGAN) 6.25 mg/5 mL syrup Take 20 mLs (25 mg total) by mouth every 6 (six) hours as needed for Nausea.  Qty: 20 mL, Refills: 0         CONTINUE these medications which have NOT CHANGED    Details   rizatriptan (MAXALT) 10 MG tablet Take 1 tablet by mouth once as needed for Migraine (Max dose 3 tablets in 24 hours or 5 in one week.). May repeat in 2 hours if needed      FLUoxetine 20 MG tablet Take 60 mg by mouth every evening.       lisdexamfetamine (VYVANSE) 60 MG capsule Take 60 mg by mouth every morning.       metoprolol  succinate (TOPROL-XL) 25 MG 24 hr tablet Take 25 mg by mouth every evening.     Comments: .      spironolactone (ALDACTONE) 50 MG tablet TAKE TWO TABLETS BY MOUTH in the evening.    Comments: .           Discharge Procedure Orders   Diet general   Order Comments: Soft diet x 10 days     Other restrictions (specify):   Order Comments: No strenuous activity for two weeks     Call MD for:  temperature >100.4     Call MD for:  severe uncontrolled pain     Call MD for:   Order Comments: Persistent bleeding from mouth

## 2020-09-04 NOTE — TRANSFER OF CARE
"Anesthesia Transfer of Care Note    Patient: Venessa Hernandez    Procedure(s) Performed: Procedure(s) (LRB):  TONSILLECTOMY (Bilateral)    Patient location: PACU    Anesthesia Type: general    Transport from OR: Transported from OR on room air with adequate spontaneous ventilation    Post pain: adequate analgesia    Post assessment: no apparent anesthetic complications and tolerated procedure well    Post vital signs: stable    Level of consciousness: awake, oriented and alert    Nausea/Vomiting: no nausea/vomiting    Complications: none    Transfer of care protocol was followed      Last vitals:   Visit Vitals  /65 (BP Location: Right arm, Patient Position: Sitting)   Pulse 85   Temp 37.1 °C (98.8 °F) (Temporal)   Resp 18   Ht 5' 1" (1.549 m)   Wt 65 kg (143 lb 4.8 oz)   SpO2 99%   Breastfeeding No   BMI 27.08 kg/m²     "

## 2020-09-04 NOTE — ANESTHESIA POSTPROCEDURE EVALUATION
Anesthesia Post Evaluation    Patient: Venessa Hernandez    Procedure(s) Performed: Procedure(s) (LRB):  TONSILLECTOMY (Bilateral)    Final Anesthesia Type: general    Patient location during evaluation: PACU  Patient participation: Yes- Able to Participate  Level of consciousness: awake and alert and oriented  Post-procedure vital signs: reviewed and stable  Pain management: adequate  Airway patency: patent    PONV status at discharge: No PONV  Anesthetic complications: no      Cardiovascular status: blood pressure returned to baseline, stable and hemodynamically stable  Respiratory status: unassisted  Hydration status: euvolemic  Follow-up not needed.          Vitals Value Taken Time   /68 09/04/20 1115   Temp 36.6 °C (97.8 °F) 09/04/20 1100   Pulse 83 09/04/20 1115   Resp 20 09/04/20 1115   SpO2 100 % 09/04/20 1115         Event Time   Out of Recovery 11:00:00         Pain/Madie Score: Pain Rating Prior to Med Admin: 0 (9/4/2020  8:41 AM)  Madie Score: 10 (9/4/2020 11:00 AM)

## 2020-09-07 ENCOUNTER — PATIENT MESSAGE (OUTPATIENT)
Dept: OTOLARYNGOLOGY | Facility: CLINIC | Age: 30
End: 2020-09-07

## 2020-09-08 RX ORDER — HYDROCODONE BITARTRATE AND ACETAMINOPHEN 7.5; 325 MG/15ML; MG/15ML
20 SOLUTION ORAL EVERY 4 HOURS PRN
Qty: 473 ML | Refills: 0 | Status: SHIPPED | OUTPATIENT
Start: 2020-09-08 | End: 2021-02-04

## 2020-09-09 LAB
FINAL PATHOLOGIC DIAGNOSIS: NORMAL
GROSS: NORMAL

## 2020-09-23 ENCOUNTER — OFFICE VISIT (OUTPATIENT)
Dept: OTOLARYNGOLOGY | Facility: CLINIC | Age: 30
End: 2020-09-23
Payer: OTHER GOVERNMENT

## 2020-09-23 VITALS
BODY MASS INDEX: 24.91 KG/M2 | TEMPERATURE: 98 F | SYSTOLIC BLOOD PRESSURE: 122 MMHG | DIASTOLIC BLOOD PRESSURE: 79 MMHG | WEIGHT: 131.81 LBS | HEART RATE: 101 BPM

## 2020-09-23 DIAGNOSIS — J03.90 EXUDATIVE TONSILLITIS: Primary | ICD-10-CM

## 2020-09-23 PROCEDURE — 99213 OFFICE O/P EST LOW 20 MIN: CPT | Mod: PBBFAC | Performed by: PHYSICIAN ASSISTANT

## 2020-09-23 PROCEDURE — 99999 PR PBB SHADOW E&M-EST. PATIENT-LVL III: ICD-10-PCS | Mod: PBBFAC,,, | Performed by: PHYSICIAN ASSISTANT

## 2020-09-23 PROCEDURE — 99024 POSTOP FOLLOW-UP VISIT: CPT | Mod: ,,, | Performed by: PHYSICIAN ASSISTANT

## 2020-09-23 PROCEDURE — 99024 PR POST-OP FOLLOW-UP VISIT: ICD-10-PCS | Mod: ,,, | Performed by: PHYSICIAN ASSISTANT

## 2020-09-23 PROCEDURE — 99999 PR PBB SHADOW E&M-EST. PATIENT-LVL III: CPT | Mod: PBBFAC,,, | Performed by: PHYSICIAN ASSISTANT

## 2020-09-23 NOTE — PROGRESS NOTES
Subjective:    Here to followup after tonsillectomy    Patient ID: Venessa Hernandez is a 30 y.o. female.    Chief Complaint:  Recent tonsillectomy 9/4/2020     Venessa Hernandez is a 30 y.o. female here to see me today after a recent tonsillectomy.   Following surgery, she is doing quite well at this time.  She experienced pain for 7 days, but is no longer requiring any oral pain medicine.  She has resumed a regular diet and all normal activities.  She did not experience any postoperative bleeding.  She has noted that while drinking, liquids sometimes reflux into her nose.  They have no specific questions or concerns today.    Review of Systems   Constitutional: Negative for fever and fatigue.   HENT: Negative for ear pain, mouth sores, sore throat, trouble swallowing and voice change.    Respiratory: Negative for cough.    Cardiovascular: Negative for chest pain.   Neurological: Negative for headaches.       Objective:     Physical Exam   Constitutional: She appears well-developed and well-nourished.   HENT:   Head: Normocephalic.   Right Ear: Tympanic membrane, external ear and ear canal normal. Tympanic membrane is not erythematous.   Left Ear: Tympanic membrane, external ear and ear canal normal. Tympanic membrane is not erythematous.   Nose: Nose normal. No rhinorrhea.   Mouth/Throat: Uvula is midline and oropharynx is clear and moist. No trismus in the jaw. Normal dentition. No uvula swelling.   Status post tonsillectomy, tonsillar fossae healing well   Lymphadenopathy:     She has no cervical adenopathy.       Assessment:     1. Exudative tonsillitis        Plan:       1. Exudative tonsillitis    :      Now status post tonsillectomy and doing well.  She is having some signs of velopharyngeal insufficiency (liquids refluxing into nose while drinking) and we discussed that this abnormal pattern may resolve within the next 4-6 weeks as everything heals.  Instructed her to call me if persists beyond 6  weeks; she may need ST.  No further treatment needed at this time.   Return to clinic as needed.

## 2021-02-02 ENCOUNTER — PATIENT MESSAGE (OUTPATIENT)
Dept: OBSTETRICS AND GYNECOLOGY | Facility: CLINIC | Age: 31
End: 2021-02-02

## 2021-02-04 ENCOUNTER — OFFICE VISIT (OUTPATIENT)
Dept: OBSTETRICS AND GYNECOLOGY | Facility: CLINIC | Age: 31
End: 2021-02-04
Payer: OTHER GOVERNMENT

## 2021-02-04 VITALS
HEIGHT: 61 IN | BODY MASS INDEX: 25.27 KG/M2 | SYSTOLIC BLOOD PRESSURE: 132 MMHG | WEIGHT: 133.81 LBS | DIASTOLIC BLOOD PRESSURE: 78 MMHG

## 2021-02-04 DIAGNOSIS — Z01.419 ENCOUNTER FOR GYNECOLOGICAL EXAMINATION WITHOUT ABNORMAL FINDING: Primary | ICD-10-CM

## 2021-02-04 DIAGNOSIS — Z30.41 ENCOUNTER FOR SURVEILLANCE OF CONTRACEPTIVE PILLS: ICD-10-CM

## 2021-02-04 PROCEDURE — 99999 PR PBB SHADOW E&M-EST. PATIENT-LVL III: CPT | Mod: PBBFAC,,, | Performed by: NURSE PRACTITIONER

## 2021-02-04 PROCEDURE — 99999 PR PBB SHADOW E&M-EST. PATIENT-LVL III: ICD-10-PCS | Mod: PBBFAC,,, | Performed by: NURSE PRACTITIONER

## 2021-02-04 PROCEDURE — 99385 PREV VISIT NEW AGE 18-39: CPT | Mod: S$PBB,,, | Performed by: NURSE PRACTITIONER

## 2021-02-04 PROCEDURE — 99213 OFFICE O/P EST LOW 20 MIN: CPT | Mod: PBBFAC | Performed by: NURSE PRACTITIONER

## 2021-02-04 PROCEDURE — 99385 PR PREVENTIVE VISIT,NEW,18-39: ICD-10-PCS | Mod: S$PBB,,, | Performed by: NURSE PRACTITIONER

## 2021-02-04 PROCEDURE — 88175 CYTOPATH C/V AUTO FLUID REDO: CPT

## 2021-02-04 RX ORDER — DROSPIRENONE AND ETHINYL ESTRADIOL 0.03MG-3MG
1 KIT ORAL DAILY
Qty: 30 TABLET | Refills: 11 | Status: SHIPPED | OUTPATIENT
Start: 2021-02-04 | End: 2021-02-04 | Stop reason: SDUPTHER

## 2021-02-04 RX ORDER — LISDEXAMFETAMINE DIMESYLATE 60 MG/1
60 CAPSULE ORAL
COMMUNITY
Start: 2021-01-15 | End: 2021-02-14

## 2021-02-04 RX ORDER — FLUOXETINE HYDROCHLORIDE 20 MG/1
CAPSULE ORAL
Status: ON HOLD | COMMUNITY
Start: 2021-01-18 | End: 2022-11-15

## 2021-02-04 RX ORDER — DROSPIRENONE AND ETHINYL ESTRADIOL 0.03MG-3MG
1 KIT ORAL DAILY
Qty: 90 TABLET | Refills: 3 | Status: SHIPPED | OUTPATIENT
Start: 2021-02-04 | End: 2021-06-28 | Stop reason: SDUPTHER

## 2021-02-23 LAB
FINAL PATHOLOGIC DIAGNOSIS: NORMAL
Lab: NORMAL

## 2021-02-24 ENCOUNTER — PATIENT MESSAGE (OUTPATIENT)
Dept: OBSTETRICS AND GYNECOLOGY | Facility: CLINIC | Age: 31
End: 2021-02-24

## 2021-03-02 ENCOUNTER — PATIENT MESSAGE (OUTPATIENT)
Dept: OBSTETRICS AND GYNECOLOGY | Facility: CLINIC | Age: 31
End: 2021-03-02

## 2021-03-25 ENCOUNTER — OFFICE VISIT (OUTPATIENT)
Dept: OBSTETRICS AND GYNECOLOGY | Facility: CLINIC | Age: 31
End: 2021-03-25
Payer: OTHER GOVERNMENT

## 2021-03-25 VITALS
SYSTOLIC BLOOD PRESSURE: 122 MMHG | BODY MASS INDEX: 25.34 KG/M2 | DIASTOLIC BLOOD PRESSURE: 64 MMHG | HEIGHT: 61 IN | WEIGHT: 134.25 LBS

## 2021-03-25 DIAGNOSIS — N92.1 BREAKTHROUGH BLEEDING ON BIRTH CONTROL PILLS: Primary | ICD-10-CM

## 2021-03-25 DIAGNOSIS — N89.8 VAGINAL ODOR: ICD-10-CM

## 2021-03-25 PROCEDURE — 99214 OFFICE O/P EST MOD 30 MIN: CPT | Mod: S$PBB,,, | Performed by: NURSE PRACTITIONER

## 2021-03-25 PROCEDURE — 99214 PR OFFICE/OUTPT VISIT, EST, LEVL IV, 30-39 MIN: ICD-10-PCS | Mod: S$PBB,,, | Performed by: NURSE PRACTITIONER

## 2021-03-25 PROCEDURE — 99999 PR PBB SHADOW E&M-EST. PATIENT-LVL III: ICD-10-PCS | Mod: PBBFAC,,, | Performed by: NURSE PRACTITIONER

## 2021-03-25 PROCEDURE — 99213 OFFICE O/P EST LOW 20 MIN: CPT | Mod: PBBFAC | Performed by: NURSE PRACTITIONER

## 2021-03-25 PROCEDURE — 81025 URINE PREGNANCY TEST: CPT | Mod: PBBFAC | Performed by: NURSE PRACTITIONER

## 2021-03-25 PROCEDURE — 99999 PR PBB SHADOW E&M-EST. PATIENT-LVL III: CPT | Mod: PBBFAC,,, | Performed by: NURSE PRACTITIONER

## 2021-03-25 PROCEDURE — 87210 SMEAR WET MOUNT SALINE/INK: CPT | Mod: PBBFAC,59 | Performed by: NURSE PRACTITIONER

## 2021-03-25 PROCEDURE — 87481 CANDIDA DNA AMP PROBE: CPT | Mod: 59 | Performed by: NURSE PRACTITIONER

## 2021-03-25 RX ORDER — LISDEXAMFETAMINE DIMESYLATE 60 MG/1
60 CAPSULE ORAL DAILY
Status: ON HOLD | COMMUNITY
Start: 2021-03-12 | End: 2022-11-15

## 2021-03-25 RX ORDER — METRONIDAZOLE 500 MG/1
500 TABLET ORAL EVERY 12 HOURS
Qty: 14 TABLET | Refills: 0 | Status: SHIPPED | OUTPATIENT
Start: 2021-03-25 | End: 2021-04-01

## 2021-03-26 ENCOUNTER — PATIENT MESSAGE (OUTPATIENT)
Dept: OBSTETRICS AND GYNECOLOGY | Facility: CLINIC | Age: 31
End: 2021-03-26

## 2021-03-26 LAB
BACTERIAL VAGINOSIS DNA: POSITIVE
CANDIDA GLABRATA DNA: NEGATIVE
CANDIDA KRUSEI DNA: NEGATIVE
CANDIDA RRNA VAG QL PROBE: NEGATIVE
T VAGINALIS RRNA GENITAL QL PROBE: NEGATIVE

## 2021-04-28 ENCOUNTER — PATIENT MESSAGE (OUTPATIENT)
Dept: RESEARCH | Facility: HOSPITAL | Age: 31
End: 2021-04-28

## 2021-06-28 ENCOUNTER — PATIENT MESSAGE (OUTPATIENT)
Dept: OBSTETRICS AND GYNECOLOGY | Facility: CLINIC | Age: 31
End: 2021-06-28

## 2021-08-31 ENCOUNTER — TELEPHONE (OUTPATIENT)
Dept: OBSTETRICS AND GYNECOLOGY | Facility: CLINIC | Age: 31
End: 2021-08-31

## 2022-10-11 ENCOUNTER — TELEPHONE (OUTPATIENT)
Dept: OBSTETRICS AND GYNECOLOGY | Facility: CLINIC | Age: 32
End: 2022-10-11
Payer: COMMERCIAL

## 2022-10-11 NOTE — TELEPHONE ENCOUNTER
"  I called the patient because she was on [facility]'s schedule for tomorrow for an ER Follow up regarding a Bartholin Cyst.  At this time, [facility] does not treat patients regarding these specific cysts.  I reviewed the patient's chart and noticed that the appointment was scheduled by Verónica.    Before calling the patient I confirmed and asked Fanta if she was seeing these specific patients yet.  Unfortunately, she doesn't.    I was advised from Fanta and the staff in the clinic that the patient would benefit best by seeing a medical doctor.  I reviewed the appointment availability and scheduled the patient with the next available date - That appointment was scheduled with Scott Barton MD on 11/2/22. (This was the next available appt that wasn't on "Hold" at the [facility])    I called and greeted the patients and explained why I called. I apologized and advised her that [facility] doesn't see patients regarding these specific cysts, and it was recommended that she sees one of the medical providers for further assessment - And that the appointment was scheduled with the first available doctor at the [facility].    The patient became angry and started yelling that "we" ([facility]) was turning her away "again" .. she then proceeded to say that she had to go to an ER because she had fever, it was infected, and she had a hole"  I waited for the patient to calm down and proceeded to try to explain that we weren't turning her away. I was simply trying to explain that we had to reschedule her appointment with someone who was qualified. She began to swear, yell, and say that she was in the medical field, and we were turning her away. She said that she would come to her visit tomorrow despite me explaining that [facility] doesn't specialize in this specific manner and a medical doctor would be more qualified.    The patient said that she was only given antibiotics that would last a week and after that week had passed, she would " still run into the same problems such as fever, drainage, infections, etc.  I advised the patient furthermore that if the problems continue until being able to come in clinic with Dr. Barton she can go back to the ER.    Patient then asked to speak to someone such as a supervisor/manager so she could escalate her concerns. I advised the patient that at this time we don't have the personnel available, but I would reach out to them.  The patient began swearing and insinuating that I giggled (I sniffed my nose) and then hung up the phone.  Fanta, along with Venessa heard the conversation as well.      I'm not sure if the scheduling staff is aware -But at this current time, [facility] does not specialize/treat patients regarding:   Draining/Infected Bartholin Cyst   Paragard IUD Insertion/Removal   EMB  Colpo  Leep  Mole/Wart Removal

## 2022-10-12 ENCOUNTER — OFFICE VISIT (OUTPATIENT)
Dept: OBSTETRICS AND GYNECOLOGY | Facility: CLINIC | Age: 32
End: 2022-10-12
Payer: COMMERCIAL

## 2022-10-12 VITALS
SYSTOLIC BLOOD PRESSURE: 140 MMHG | WEIGHT: 145.75 LBS | DIASTOLIC BLOOD PRESSURE: 98 MMHG | BODY MASS INDEX: 27.52 KG/M2 | HEIGHT: 61 IN

## 2022-10-12 DIAGNOSIS — N75.0 BARTHOLIN CYST: Primary | ICD-10-CM

## 2022-10-12 PROCEDURE — 3077F SYST BP >= 140 MM HG: CPT | Mod: CPTII,S$GLB,,

## 2022-10-12 PROCEDURE — 99999 PR PBB SHADOW E&M-EST. PATIENT-LVL III: ICD-10-PCS | Mod: PBBFAC,,,

## 2022-10-12 PROCEDURE — 3080F PR MOST RECENT DIASTOLIC BLOOD PRESSURE >= 90 MM HG: ICD-10-PCS | Mod: CPTII,S$GLB,,

## 2022-10-12 PROCEDURE — 3077F PR MOST RECENT SYSTOLIC BLOOD PRESSURE >= 140 MM HG: ICD-10-PCS | Mod: CPTII,S$GLB,,

## 2022-10-12 PROCEDURE — 1160F PR REVIEW ALL MEDS BY PRESCRIBER/CLIN PHARMACIST DOCUMENTED: ICD-10-PCS | Mod: CPTII,S$GLB,,

## 2022-10-12 PROCEDURE — 3080F DIAST BP >= 90 MM HG: CPT | Mod: CPTII,S$GLB,,

## 2022-10-12 PROCEDURE — 1159F PR MEDICATION LIST DOCUMENTED IN MEDICAL RECORD: ICD-10-PCS | Mod: CPTII,S$GLB,,

## 2022-10-12 PROCEDURE — 1159F MED LIST DOCD IN RCRD: CPT | Mod: CPTII,S$GLB,,

## 2022-10-12 PROCEDURE — 99212 PR OFFICE/OUTPT VISIT, EST, LEVL II, 10-19 MIN: ICD-10-PCS | Mod: S$GLB,,,

## 2022-10-12 PROCEDURE — 99212 OFFICE O/P EST SF 10 MIN: CPT | Mod: S$GLB,,,

## 2022-10-12 PROCEDURE — 3008F PR BODY MASS INDEX (BMI) DOCUMENTED: ICD-10-PCS | Mod: CPTII,S$GLB,,

## 2022-10-12 PROCEDURE — 3008F BODY MASS INDEX DOCD: CPT | Mod: CPTII,S$GLB,,

## 2022-10-12 PROCEDURE — 99999 PR PBB SHADOW E&M-EST. PATIENT-LVL III: CPT | Mod: PBBFAC,,,

## 2022-10-12 PROCEDURE — 1160F RVW MEDS BY RX/DR IN RCRD: CPT | Mod: CPTII,S$GLB,,

## 2022-10-12 RX ORDER — LIDOCAINE HYDROCHLORIDE 20 MG/ML
2 JELLY TOPICAL
Status: ON HOLD | COMMUNITY
End: 2022-11-15

## 2022-10-12 RX ORDER — HYDROCODONE BITARTRATE AND ACETAMINOPHEN 5; 325 MG/1; MG/1
1 TABLET ORAL EVERY 8 HOURS PRN
Status: ON HOLD | COMMUNITY
Start: 2022-10-10 | End: 2022-11-15

## 2022-10-12 RX ORDER — BUPROPION HYDROCHLORIDE 300 MG/1
300 TABLET ORAL DAILY
Status: ON HOLD | COMMUNITY
Start: 2022-09-20 | End: 2022-11-15 | Stop reason: HOSPADM

## 2022-10-12 RX ORDER — BUSPIRONE HYDROCHLORIDE 7.5 MG/1
7.5 TABLET ORAL 2 TIMES DAILY
Status: ON HOLD | COMMUNITY
Start: 2022-09-20 | End: 2022-11-15

## 2022-10-12 RX ORDER — DOXYCYCLINE 100 MG/1
100 CAPSULE ORAL 2 TIMES DAILY
Status: ON HOLD | COMMUNITY
Start: 2022-10-10 | End: 2022-11-15

## 2022-10-12 RX ORDER — IBUPROFEN 800 MG/1
800 TABLET ORAL EVERY 8 HOURS PRN
Status: ON HOLD | COMMUNITY
Start: 2022-10-10 | End: 2022-11-15

## 2022-10-12 NOTE — LETTER
October 12, 2022      [facility]  [address] [facility] [address]  Phone: [phone]  Fax: [phone]       Patient: Venessa Hernandez   YOB: 1990  Date of Visit: 10/12/2022    To Whom It May Concern:    Jyoti Hernandez  was at [facility] on 10/12/2022. The patient may return to work on 10/17/2022 no restrictions. If you have any questions or concerns, or if I can be of further assistance, please do not hesitate to contact me.    Sincerely,    [facility] Jackson NP

## 2022-10-12 NOTE — PROGRESS NOTES
Subjective:       Patient ID: Venessa Hernandez is a 32 y.o. female.    Chief Complaint:  follow up (ER visit of bartholin cyst)      History of Present Illness  HPI    Patient presents for follow up visit of ruptured Bartholin cyst   Prescribed doxycycline and hydrocodone  Patient prefers taking ibuprofen and tylenol for pain relief   Will complete remaining 4 days of antibiotic       GYN & OB History  Patient's last menstrual period was 10/03/2022 (approximate).   Date of Last Pap: 2021    OB History    Para Term  AB Living   2 2 2     2   SAB IAB Ectopic Multiple Live Births         0 1      # Outcome Date GA Lbr Vernon/2nd Weight Sex Delivery Anes PTL Lv   2 Term 17 40w0d / 00:17 3.13 kg (6 lb 14.4 oz) M Vag-Vacuum EPI N SERVANDO      Complications: Fetal Intolerance   1 Term      Vag-Spont          Review of Systems  Review of Systems   Constitutional:  Negative for appetite change, fatigue and fever.   Gastrointestinal:  Negative for abdominal pain, bloating, constipation, diarrhea, nausea and vomiting.   Genitourinary:  Positive for vaginal pain. Negative for bladder incontinence, dysmenorrhea, dyspareunia, dysuria, flank pain, frequency, genital sores, menorrhagia, menstrual problem, pelvic pain, urgency, vaginal bleeding, vaginal discharge, postcoital bleeding, vaginal dryness and vaginal odor.   All other systems reviewed and are negative.        Objective:      Physical Exam:   Constitutional: She is oriented to person, place, and time. She appears well-developed and well-nourished. No distress.    HENT:   Head: Normocephalic and atraumatic.    Eyes: Pupils are equal, round, and reactive to light. Conjunctivae and EOM are normal.     Cardiovascular:  Normal rate.             Pulmonary/Chest: Effort normal.        Abdominal: Soft. She exhibits no distension. There is no abdominal tenderness. There is no rebound and no guarding. Hernia confirmed negative in the right inguinal area and  confirmed negative in the left inguinal area.     Genitourinary:    Inguinal canal normal.   Rectum:      No external hemorrhoid.            Pelvic exam was performed with patient supine.   The external female genitalia was normal.   No external genitalia lesions identified,Genitalia hair distrobution normal .   There is no rash, tenderness, lesion or injury on the right labia. There is tenderness and lesion on the left labia. There is no rash or injury on the left labia. Right adnexum displays no mass, no tenderness and no fullness. Left adnexum displays no mass, no tenderness and no fullness. No erythema,  no vaginal discharge, tenderness or bleeding in the vagina.    No foreign body in the vagina.      No signs of injury in the vagina.   Cervix exhibits no motion tenderness, no lesion, no friability, no lesion, no tenderness and no polyp. Uterus is not enlarged and not tender. Urethral Meatus exhibits: urethral lesion and prolapsedUrethra findings: no urethral mass, no tenderness and no urethral scarringBladder findings: no bladder distention and no bladder tenderness          Musculoskeletal: Normal range of motion and moves all extremeties.      Lymphadenopathy: No inguinal adenopathy noted on the right or left side.    Neurological: She is alert and oriented to person, place, and time.    Skin: Skin is warm and dry. No rash noted. She is not diaphoretic. No erythema. No pallor.    Psychiatric: She has a normal mood and affect. Her behavior is normal. Judgment and thought content normal.           Assessment:        1. Bartholin cyst               Plan:   Continue antibiotics and pain medication prescribed at ER  Apply Aquaphor as barrier  Cotton underwear avoid panty-liner or pad use, no underwear at night  Work excused printed and given to clinic  Fever and infection precautions discussed   RTC if symptoms worsen or do not resolve       Bartholin cyst         Fanta Jackson, VALERIY

## 2022-11-11 ENCOUNTER — HOSPITAL ENCOUNTER (EMERGENCY)
Facility: HOSPITAL | Age: 32
Discharge: PSYCHIATRIC OR SUBSTANCE USE FACILITY | End: 2022-11-12
Attending: EMERGENCY MEDICINE
Payer: COMMERCIAL

## 2022-11-11 DIAGNOSIS — Z00.8 MEDICAL CLEARANCE FOR PSYCHIATRIC ADMISSION: ICD-10-CM

## 2022-11-11 DIAGNOSIS — R45.851 DEPRESSION WITH SUICIDAL IDEATION: Primary | ICD-10-CM

## 2022-11-11 DIAGNOSIS — F32.A DEPRESSION WITH SUICIDAL IDEATION: Primary | ICD-10-CM

## 2022-11-11 DIAGNOSIS — N39.0 URINARY TRACT INFECTION WITHOUT HEMATURIA, SITE UNSPECIFIED: ICD-10-CM

## 2022-11-11 LAB
ALBUMIN SERPL BCP-MCNC: 3.9 G/DL (ref 3.5–5.2)
ALP SERPL-CCNC: 79 U/L (ref 55–135)
ALT SERPL W/O P-5'-P-CCNC: 32 U/L (ref 10–44)
AMPHET+METHAMPHET UR QL: NEGATIVE
ANION GAP SERPL CALC-SCNC: 11 MMOL/L (ref 8–16)
APAP SERPL-MCNC: <3 UG/ML (ref 10–20)
AST SERPL-CCNC: 31 U/L (ref 10–40)
B-HCG UR QL: NEGATIVE
BACTERIA #/AREA URNS HPF: ABNORMAL /HPF
BARBITURATES UR QL SCN>200 NG/ML: NEGATIVE
BASOPHILS # BLD AUTO: 0.05 K/UL (ref 0–0.2)
BASOPHILS NFR BLD: 0.8 % (ref 0–1.9)
BENZODIAZ UR QL SCN>200 NG/ML: NEGATIVE
BILIRUB SERPL-MCNC: 0.3 MG/DL (ref 0.1–1)
BILIRUB UR QL STRIP: NEGATIVE
BUN SERPL-MCNC: 7 MG/DL (ref 6–20)
BZE UR QL SCN: NEGATIVE
CALCIUM SERPL-MCNC: 9.4 MG/DL (ref 8.7–10.5)
CANNABINOIDS UR QL SCN: NEGATIVE
CHLORIDE SERPL-SCNC: 106 MMOL/L (ref 95–110)
CLARITY UR: ABNORMAL
CO2 SERPL-SCNC: 23 MMOL/L (ref 23–29)
COLOR UR: YELLOW
CREAT SERPL-MCNC: 0.7 MG/DL (ref 0.5–1.4)
CREAT UR-MCNC: 134.6 MG/DL (ref 15–325)
DIFFERENTIAL METHOD: ABNORMAL
EOSINOPHIL # BLD AUTO: 0.1 K/UL (ref 0–0.5)
EOSINOPHIL NFR BLD: 1.3 % (ref 0–8)
ERYTHROCYTE [DISTWIDTH] IN BLOOD BY AUTOMATED COUNT: 12.2 % (ref 11.5–14.5)
EST. GFR  (NO RACE VARIABLE): >60 ML/MIN/1.73 M^2
ETHANOL SERPL-MCNC: <10 MG/DL
GLUCOSE SERPL-MCNC: 107 MG/DL (ref 70–110)
GLUCOSE UR QL STRIP: NEGATIVE
HCT VFR BLD AUTO: 38.5 % (ref 37–48.5)
HCV AB SERPL QL IA: NEGATIVE
HEP C VIRUS HOLD SPECIMEN: NORMAL
HGB BLD-MCNC: 13.1 G/DL (ref 12–16)
HGB UR QL STRIP: ABNORMAL
HIV 1+2 AB+HIV1 P24 AG SERPL QL IA: NEGATIVE
IMM GRANULOCYTES # BLD AUTO: 0.01 K/UL (ref 0–0.04)
IMM GRANULOCYTES NFR BLD AUTO: 0.2 % (ref 0–0.5)
KETONES UR QL STRIP: NEGATIVE
LEUKOCYTE ESTERASE UR QL STRIP: ABNORMAL
LYMPHOCYTES # BLD AUTO: 2 K/UL (ref 1–4.8)
LYMPHOCYTES NFR BLD: 33.8 % (ref 18–48)
MCH RBC QN AUTO: 32.2 PG (ref 27–31)
MCHC RBC AUTO-ENTMCNC: 34 G/DL (ref 32–36)
MCV RBC AUTO: 95 FL (ref 82–98)
METHADONE UR QL SCN>300 NG/ML: NEGATIVE
MICROSCOPIC COMMENT: ABNORMAL
MONOCYTES # BLD AUTO: 0.5 K/UL (ref 0.3–1)
MONOCYTES NFR BLD: 8.7 % (ref 4–15)
NEUTROPHILS # BLD AUTO: 3.3 K/UL (ref 1.8–7.7)
NEUTROPHILS NFR BLD: 55.2 % (ref 38–73)
NITRITE UR QL STRIP: POSITIVE
NRBC BLD-RTO: 0 /100 WBC
OPIATES UR QL SCN: NEGATIVE
PCP UR QL SCN>25 NG/ML: NEGATIVE
PH UR STRIP: 7 [PH] (ref 5–8)
PLATELET # BLD AUTO: 302 K/UL (ref 150–450)
PMV BLD AUTO: 8.4 FL (ref 9.2–12.9)
POTASSIUM SERPL-SCNC: 4 MMOL/L (ref 3.5–5.1)
PROT SERPL-MCNC: 7.4 G/DL (ref 6–8.4)
PROT UR QL STRIP: NEGATIVE
RBC # BLD AUTO: 4.07 M/UL (ref 4–5.4)
RBC #/AREA URNS HPF: 11 /HPF (ref 0–4)
SARS-COV-2 RDRP RESP QL NAA+PROBE: NEGATIVE
SODIUM SERPL-SCNC: 140 MMOL/L (ref 136–145)
SP GR UR STRIP: 1.01 (ref 1–1.03)
SQUAMOUS #/AREA URNS HPF: 11 /HPF
TOXICOLOGY INFORMATION: NORMAL
TSH SERPL DL<=0.005 MIU/L-ACNC: 0.78 UIU/ML (ref 0.4–4)
URN SPEC COLLECT METH UR: ABNORMAL
UROBILINOGEN UR STRIP-ACNC: NEGATIVE EU/DL
WBC # BLD AUTO: 5.95 K/UL (ref 3.9–12.7)
WBC #/AREA URNS HPF: 30 /HPF (ref 0–5)
WBC CLUMPS URNS QL MICRO: ABNORMAL

## 2022-11-11 PROCEDURE — 84443 ASSAY THYROID STIM HORMONE: CPT | Performed by: EMERGENCY MEDICINE

## 2022-11-11 PROCEDURE — G0425 INPT/ED TELECONSULT30: HCPCS | Mod: GT,,, | Performed by: PSYCHIATRY & NEUROLOGY

## 2022-11-11 PROCEDURE — 85025 COMPLETE CBC W/AUTO DIFF WBC: CPT | Performed by: EMERGENCY MEDICINE

## 2022-11-11 PROCEDURE — 80307 DRUG TEST PRSMV CHEM ANLYZR: CPT | Performed by: EMERGENCY MEDICINE

## 2022-11-11 PROCEDURE — 87086 URINE CULTURE/COLONY COUNT: CPT | Performed by: EMERGENCY MEDICINE

## 2022-11-11 PROCEDURE — 99285 EMERGENCY DEPT VISIT HI MDM: CPT | Mod: 25

## 2022-11-11 PROCEDURE — 86803 HEPATITIS C AB TEST: CPT | Performed by: EMERGENCY MEDICINE

## 2022-11-11 PROCEDURE — 87077 CULTURE AEROBIC IDENTIFY: CPT | Performed by: EMERGENCY MEDICINE

## 2022-11-11 PROCEDURE — 81000 URINALYSIS NONAUTO W/SCOPE: CPT | Performed by: EMERGENCY MEDICINE

## 2022-11-11 PROCEDURE — 87088 URINE BACTERIA CULTURE: CPT | Performed by: EMERGENCY MEDICINE

## 2022-11-11 PROCEDURE — 87389 HIV-1 AG W/HIV-1&-2 AB AG IA: CPT | Performed by: EMERGENCY MEDICINE

## 2022-11-11 PROCEDURE — 87186 SC STD MICRODIL/AGAR DIL: CPT | Performed by: EMERGENCY MEDICINE

## 2022-11-11 PROCEDURE — 25000003 PHARM REV CODE 250: Performed by: EMERGENCY MEDICINE

## 2022-11-11 PROCEDURE — 80143 DRUG ASSAY ACETAMINOPHEN: CPT | Performed by: EMERGENCY MEDICINE

## 2022-11-11 PROCEDURE — 80053 COMPREHEN METABOLIC PANEL: CPT | Performed by: EMERGENCY MEDICINE

## 2022-11-11 PROCEDURE — 81025 URINE PREGNANCY TEST: CPT | Performed by: EMERGENCY MEDICINE

## 2022-11-11 PROCEDURE — U0002 COVID-19 LAB TEST NON-CDC: HCPCS | Performed by: EMERGENCY MEDICINE

## 2022-11-11 PROCEDURE — G0425 PR INPT TELEHEALTH CONSULT 30M: ICD-10-PCS | Mod: GT,,, | Performed by: PSYCHIATRY & NEUROLOGY

## 2022-11-11 PROCEDURE — 82077 ASSAY SPEC XCP UR&BREATH IA: CPT | Performed by: EMERGENCY MEDICINE

## 2022-11-11 RX ORDER — SULFAMETHOXAZOLE AND TRIMETHOPRIM 800; 160 MG/1; MG/1
1 TABLET ORAL 2 TIMES DAILY
Qty: 14 TABLET | Refills: 0 | Status: ON HOLD | OUTPATIENT
Start: 2022-11-11 | End: 2022-11-15

## 2022-11-11 RX ORDER — SULFAMETHOXAZOLE AND TRIMETHOPRIM 800; 160 MG/1; MG/1
1 TABLET ORAL
Status: COMPLETED | OUTPATIENT
Start: 2022-11-11 | End: 2022-11-11

## 2022-11-11 RX ADMIN — SULFAMETHOXAZOLE AND TRIMETHOPRIM 1 TABLET: 800; 160 TABLET ORAL at 11:11

## 2022-11-12 VITALS
TEMPERATURE: 98 F | DIASTOLIC BLOOD PRESSURE: 81 MMHG | WEIGHT: 146.63 LBS | RESPIRATION RATE: 16 BRPM | BODY MASS INDEX: 27.7 KG/M2 | SYSTOLIC BLOOD PRESSURE: 130 MMHG | OXYGEN SATURATION: 100 % | HEART RATE: 74 BPM

## 2022-11-12 PROBLEM — R45.851 DEPRESSION WITH SUICIDAL IDEATION: Status: ACTIVE | Noted: 2022-11-12

## 2022-11-12 PROBLEM — F48.9 MENTAL HEALTH PROBLEM: Status: ACTIVE | Noted: 2022-11-12

## 2022-11-12 PROBLEM — N39.0 URINARY TRACT INFECTION WITHOUT HEMATURIA: Status: ACTIVE | Noted: 2022-11-12

## 2022-11-12 PROBLEM — Z00.8 MEDICAL CLEARANCE FOR PSYCHIATRIC ADMISSION: Status: ACTIVE | Noted: 2022-11-12

## 2022-11-12 PROBLEM — F32.A DEPRESSION WITH SUICIDAL IDEATION: Status: ACTIVE | Noted: 2022-11-12

## 2022-11-12 NOTE — ED PROVIDER NOTES
SCRIBE #1 NOTE: I, August Carey, am scribing for, and in the presence of, Marco Gonzalez Jr., MD. I have scribed the entire note.       History     Chief Complaint   Patient presents with    Depression     Pt reports SI without plan, hx: depression, stop all medication 3 days ago     Review of patient's allergies indicates:  No Known Allergies      History of Present Illness     HPI    11/11/2022, 8:49 PM  History obtained from the patient      History of Present Illness: Venessa Hernandez is a 32 y.o. female patient with a PMHx of anxiety and depression who presents to the Emergency Department for psychiatric evaluation. Pt reports that she has been depressed over the past several days. Symptoms are constant and moderate in severity. No mitigating or exacerbating factors reported. Pt also notes SI several days ago, but has no specific plan to harm herself. Patient denies any HI, hallucinations, fever, chills, n/v/d, dysuria, vaginal bleeding, vaginal discharge, SOB, CP, weakness, numbness, dizziness, headache, and all other sxs at this time. Pt is currently Prozac and Wellbutrin. No further complaints or concerns at this time.       Arrival mode: Personal vehicle    PCP: Marcos Leary MD        Past Medical History:  Past Medical History:   Diagnosis Date    Abnormal Pap smear of cervix     ADD (attention deficit disorder)     Anxiety and depression        Past Surgical History:  Past Surgical History:   Procedure Laterality Date    AUGMENTATION OF BREAST      TONSILLECTOMY Bilateral 9/4/2020    Procedure: TONSILLECTOMY;  Surgeon: Miley Quinones MD;  Location: [facility];  Service: ENT;  Laterality: Bilateral;         Family History:  Family History   Problem Relation Age of Onset    No Known Problems Mother     Hypertension Father     No Known Problems Sister     Asthma Brother     Breast cancer Neg Hx     Colon cancer Neg Hx     Ovarian cancer Neg Hx        Social History:  Social History     Tobacco Use     Smoking status: Never    Smokeless tobacco: Never   Substance and Sexual Activity    Alcohol use: Yes     Comment: social    Drug use: No    Sexual activity: Yes     Partners: Male     Birth control/protection: None        Review of Systems     Review of Systems   Constitutional:  Negative for chills and fever.   HENT:  Negative for sore throat.    Respiratory:  Negative for shortness of breath.    Cardiovascular:  Negative for chest pain.   Gastrointestinal:  Negative for diarrhea, nausea and vomiting.   Genitourinary:  Negative for dysuria, vaginal bleeding and vaginal discharge.   Musculoskeletal:  Negative for back pain.   Skin:  Negative for rash.   Neurological:  Negative for dizziness, weakness, light-headedness, numbness and headaches.   Hematological:  Does not bruise/bleed easily.   Psychiatric/Behavioral:  Positive for dysphoric mood and suicidal ideas. Negative for hallucinations.         (-) HI   All other systems reviewed and are negative.     Physical Exam     Initial Vitals [11/11/22 1956]   BP Pulse Resp Temp SpO2   (!) 165/94 88 16 98.2 °F (36.8 °C) 97 %      MAP       --          Physical Exam  Nursing Notes and Vital Signs Reviewed.  Constitutional: Patient is in no acute distress. Well-developed and well-nourished.  Head: Atraumatic. Normocephalic. No evidence of basilar skull fracture.  Eyes: PERRL. EOM intact. Conjunctivae are not pale. No scleral icterus. R periorbital ecchymosis.  ENT: Mucous membranes are moist. Oropharynx is clear and symmetric.    Neck: Supple. Full ROM.   Cardiovascular: Regular rate. Regular rhythm. No murmurs, rubs, or gallops. Distal pulses are 2+ and symmetric.  Pulmonary/Chest: No respiratory distress. Clear to auscultation bilaterally. No wheezing or rales.  Abdominal: Soft and non-distended.  There is no tenderness.  No rebound, guarding, or rigidity.   Musculoskeletal: Moves all extremities. No obvious deformities. No edema.  Skin: Warm and dry.  Neurological:   Alert, awake, and appropriate.  Normal speech.  No acute focal neurological deficits are appreciated.  Psychiatric:               Behavior: cooperative              Mood and Affect: Depressed mood, flat affect   Thought Process: Blocked              Suicidal Ideations: Yes              Suicidal Plan: No specific plan to harm self              Homicidal Ideations: No              Hallucinations: none       ED Course   Procedures  ED Vital Signs:  Vitals:    11/11/22 1956 11/12/22 0008   BP: (!) 165/94 (!) 142/79   Pulse: 88 82   Resp: 16 16   Temp: 98.2 °F (36.8 °C) 98 °F (36.7 °C)   TempSrc: Oral Oral   SpO2: 97% 99%   Weight: 66.5 kg (146 lb 9.7 oz)        Abnormal Lab Results:  Labs Reviewed   CBC W/ AUTO DIFFERENTIAL - Abnormal; Notable for the following components:       Result Value    MCH 32.2 (*)     MPV 8.4 (*)     All other components within normal limits   URINALYSIS, REFLEX TO URINE CULTURE - Abnormal; Notable for the following components:    Appearance, UA Hazy (*)     Occult Blood UA 1+ (*)     Nitrite, UA Positive (*)     Leukocytes, UA 2+ (*)     All other components within normal limits    Narrative:     Specimen Source->Urine   ACETAMINOPHEN LEVEL - Abnormal; Notable for the following components:    Acetaminophen (Tylenol), Serum <3.0 (*)     All other components within normal limits   URINALYSIS MICROSCOPIC - Abnormal; Notable for the following components:    RBC, UA 11 (*)     WBC, UA 30 (*)     WBC Clumps, UA Occasional (*)     All other components within normal limits    Narrative:     Specimen Source->Urine   CULTURE, URINE   HIV 1 / 2 ANTIBODY    Narrative:     Release to patient->Immediate   HEPATITIS C ANTIBODY    Narrative:     Release to patient->Immediate   HEP C VIRUS HOLD SPECIMEN    Narrative:     Release to patient->Immediate   COMPREHENSIVE METABOLIC PANEL   TSH   DRUG SCREEN PANEL, URINE EMERGENCY    Narrative:     Specimen Source->Urine   ALCOHOL,MEDICAL (ETHANOL)   SARS-COV-2 RNA  AMPLIFICATION, QUAL   PREGNANCY TEST, URINE RAPID    Narrative:     Specimen Source->Urine        All Lab Results:  Results for orders placed or performed during the hospital encounter of 11/11/22   HIV 1/2 Ag/Ab (4th Gen)   Result Value Ref Range    HIV 1/2 Ag/Ab Negative Negative   Hepatitis C Antibody   Result Value Ref Range    Hepatitis C Ab Negative Negative   HCV Virus Hold Specimen   Result Value Ref Range    HEP C Virus Hold Specimen Hold for HCV sendout    CBC auto differential   Result Value Ref Range    WBC 5.95 3.90 - 12.70 K/uL    RBC 4.07 4.00 - 5.40 M/uL    Hemoglobin 13.1 12.0 - 16.0 g/dL    Hematocrit 38.5 37.0 - 48.5 %    MCV 95 82 - 98 fL    MCH 32.2 (H) 27.0 - 31.0 pg    MCHC 34.0 32.0 - 36.0 g/dL    RDW 12.2 11.5 - 14.5 %    Platelets 302 150 - 450 K/uL    MPV 8.4 (L) 9.2 - 12.9 fL    Immature Granulocytes 0.2 0.0 - 0.5 %    Gran # (ANC) 3.3 1.8 - 7.7 K/uL    Immature Grans (Abs) 0.01 0.00 - 0.04 K/uL    Lymph # 2.0 1.0 - 4.8 K/uL    Mono # 0.5 0.3 - 1.0 K/uL    Eos # 0.1 0.0 - 0.5 K/uL    Baso # 0.05 0.00 - 0.20 K/uL    nRBC 0 0 /100 WBC    Gran % 55.2 38.0 - 73.0 %    Lymph % 33.8 18.0 - 48.0 %    Mono % 8.7 4.0 - 15.0 %    Eosinophil % 1.3 0.0 - 8.0 %    Basophil % 0.8 0.0 - 1.9 %    Differential Method Automated    Comprehensive metabolic panel   Result Value Ref Range    Sodium 140 136 - 145 mmol/L    Potassium 4.0 3.5 - 5.1 mmol/L    Chloride 106 95 - 110 mmol/L    CO2 23 23 - 29 mmol/L    Glucose 107 70 - 110 mg/dL    BUN 7 6 - 20 mg/dL    Creatinine 0.7 0.5 - 1.4 mg/dL    Calcium 9.4 8.7 - 10.5 mg/dL    Total Protein 7.4 6.0 - 8.4 g/dL    Albumin 3.9 3.5 - 5.2 g/dL    Total Bilirubin 0.3 0.1 - 1.0 mg/dL    Alkaline Phosphatase 79 55 - 135 U/L    AST 31 10 - 40 U/L    ALT 32 10 - 44 U/L    Anion Gap 11 8 - 16 mmol/L    eGFR >60 >60 mL/min/1.73 m^2   TSH   Result Value Ref Range    TSH 0.781 0.400 - 4.000 uIU/mL   Urinalysis, Reflex to Urine Culture Urine, Clean Catch    Specimen: Urine    Result Value Ref Range    Specimen UA Urine, Clean Catch     Color, UA Yellow Yellow, Straw, Nandini    Appearance, UA Hazy (A) Clear    pH, UA 7.0 5.0 - 8.0    Specific Gravity, UA 1.015 1.005 - 1.030    Protein, UA Negative Negative    Glucose, UA Negative Negative    Ketones, UA Negative Negative    Bilirubin (UA) Negative Negative    Occult Blood UA 1+ (A) Negative    Nitrite, UA Positive (A) Negative    Urobilinogen, UA Negative <2.0 EU/dL    Leukocytes, UA 2+ (A) Negative   Drug screen panel, emergency   Result Value Ref Range    Benzodiazepines Negative Negative    Methadone metabolites Negative Negative    Cocaine (Metab.) Negative Negative    Opiate Scrn, Ur Negative Negative    Barbiturate Screen, Ur Negative Negative    Amphetamine Screen, Ur Negative Negative    THC Negative Negative    Phencyclidine Negative Negative    Creatinine, Urine 134.6 15.0 - 325.0 mg/dL    Toxicology Information SEE COMMENT    Ethanol   Result Value Ref Range    Alcohol, Serum <10 <10 mg/dL   Acetaminophen level   Result Value Ref Range    Acetaminophen (Tylenol), Serum <3.0 (L) 10.0 - 20.0 ug/mL   COVID-19 Rapid Screening   Result Value Ref Range    SARS-CoV-2 RNA, Amplification, Qual Negative Negative   Pregnancy, urine rapid   Result Value Ref Range    Preg Test, Ur Negative    Urinalysis Microscopic   Result Value Ref Range    RBC, UA 11 (H) 0 - 4 /hpf    WBC, UA 30 (H) 0 - 5 /hpf    WBC Clumps, UA Occasional (A) None-Rare    Bacteria Occasional None-Occ /hpf    Squam Epithel, UA 11 /hpf    Microscopic Comment SEE COMMENT      Imaging Results:  Imaging Results    None                 The Emergency Provider reviewed the vital signs and test results, which are outlined above.     ED Discussion     10:33 PM: Discussed pt's case with Dr. Greene (Psychiatry via Tele Psych consult), who evaluated pt at bedside and recommends placing a PEC. Dr. Greene discussed with pt's boyfriend, who states that the pt has been destroying  property in the house, cutting her wrists, and threatening to hurt herself and her children.    10:40 PM: The PEC hold has been issued by Dr. Gonzalez at this time for SI.    10:43 PM: Pt has been medically cleared by Dr. Gonzalez at this time. Reassessed pt at this time. Pt is resting comfortably and appears in no acute distress. There are no psychiatric services offered at this facility. D/w pt all pertinent ED information and plan to transfer to psychiatric facility for psychiatric treatment. Pt verbalizes understanding. Patient being transferred by [facility] for ongoing personal protection en route. Pt has been made aware of all risks and benefits associated with transfer, including but not limited to death, MVC, loss of vital signs, and/or permanent disability. Benefits include ability to be treated at an inpatient psychiatric facility. Pt will be transported by personnel trained in CPR and CPI. Patient understands that there could be unforeseen motor vehicle accidents, inclement weather, or loss of vital signs that could result in potential death or permanent disability. All questions and complaints have been addressed at this time. Pt condition is stable at this time and is clear to transfer to psychiatric facility at this time.          Medical Decision Making:   Clinical Tests:   Lab Tests: Ordered and Reviewed         ED Medication(s):  Medications   sulfamethoxazole-trimethoprim 800-160mg per tablet 1 tablet (1 tablet Oral Given 11/11/22 2322)       New Prescriptions    SULFAMETHOXAZOLE-TRIMETHOPRIM 800-160MG (BACTRIM DS) 800-160 MG TAB    Take 1 tablet by mouth 2 (two) times daily. for 7 days               Scribe Attestation:   Scribe #1: I performed the above scribed service and the documentation accurately describes the services I performed. I attest to the accuracy of the note.     Attending:   Physician Attestation Statement for Scribe #1: I, Marco Gonzalez Jr., MD, personally performed the services  described in this documentation, as scribed by August Carey, in my presence, and it is both accurate and complete.           Clinical Impression       ICD-10-CM ICD-9-CM   1. Depression with suicidal ideation  F32.A 311    R45.851 V62.84   2. Medical clearance for psychiatric admission  Z00.8 V70.8   3. Urinary tract infection without hematuria, site unspecified  N39.0 599.0       Disposition:   Disposition: Transferred  Condition: Stable       Marco Gonzalez Jr., MD  11/12/22 0022

## 2022-11-12 NOTE — ED NOTES
Pt dressed out in appropriate clothing.     Belongings secured in locker:  Shirt  Pants  Bra  Flip flops  Jacket  Ipad with cracked screen

## 2022-11-12 NOTE — CONSULTS
[facility]  Psychiatry  Telepsychiatry Consult Note    Please see previous notes:    Patient agreeable to consultation via telepsychiatry.    Tele-Consultation from Psychiatry started: 11/11/2022 at 8:40 PM  The chief complaint leading to psychiatric consultation is: Suicidal Ideation  This consultation was requested by Dr. Gonzalez, the Emergency Department attending physician.  The location of the consulting psychiatrist is [place], [place].  The patient location is  [facility] EMERGENCY DEPARTMENT   The patient arrived at the ED at: 8:00 PM    Also present with the patient at the time of the consultation: Nursing staff    Patient Identification:   Venessa Hernandez is a 32 y.o. female.    Patient information was obtained from patient and spouse/SO.  Patient presented involuntarily to the Emergency Department     Inpatient consult to Telemedicine - Psyc  Consult performed by: Samir Greene DO  Consult ordered by: Marco Gonzalez Jr., MD      Consult Start Time: 11/11/2022 20:20 CST  Consult End Time: 11/11/2022 20:40 CST      Subjective:     History of Present Illness:  Per ED MD: Venessa Hernandez is a 32 y.o. female patient with a PMHx of anxiety and depression who presents to the Emergency Department for psychiatric evaluation. Pt reports that she has been depressed over the past several days. Symptoms are constant and moderate in severity. No mitigating or exacerbating factors reported. No associated sxs reported. Patient denies any SI, HI, hallucinations, fever, chills, n/v/d, SOB, CP, weakness, numbness, dizziness, headache, and all other sxs at this time. Pt is currently Prozac and Wellbutrin. No further complaints or concerns at this time.     On Interview:  Patient seen through teleconference tonight on my approach. She reports that she got into an argument with her boyfriend two days ago. She reports that in the midst of the argument she said that she should just kill herself.  She reports that she said this out of anger and she did not mean it.  She reports that there are issues regarding finances and infidelity. She reports that the two of them broke items within the house. She reports that they have continued to argue and her boyfriend told her she needed to go to the hospital because she is unstable. She reports that she is currently prescribed psychiatric medications by her primary care doctor.     Collateral Boyfriend Crista [phone]  He reports that the patient is currently prescribed medication for her mental health and he does not believe that she is taking the medication properly. He reports that the patient gets into episodes of depression and she has tried to take her life via hanging and she has also been cutting her wrist recently. He reports that the patient has been damaging property in the house because she can't control her anger. He has tried to get her to come to the hospital several times however she has refused. He reports that her behavior worsens when the patient is drinking alcohol.     He also reports that his sister in law heard the patient state that she was going to hurt herself and her children.    Psychiatric History:   Previous Psychiatric Hospitalizations: No   Previous Medication Trials: Yes Prozac, Vyvanse, and Wellbutrin  Previous Suicide Attempts: no   History of Violence: No  History of Depression: Yes  History of Elvira: No  History of Auditory/Visual Hallucination No  History of Delusions: No  Outpatient psychiatrist (current & past): No    Substance Abuse History:  Tobacco:No  Alcohol: Yes  Illicit Substances:No  Detox/Rehab: No    Legal History: Past charges/incarcerations: Yes     Family Psychiatric History: Denies       Social History:  Developmental/Childhood:Achieved all developmental milestones timely  Education: Some college  Employment Status/Finances:Employed   Relationship Status/Sexual Orientation:  and in a  relationship  Children: 2  Housing Status: Home with boyfriend and her 2 children   history:  NO  Access to gun: NO  Confucianist:Non-practicing  Recreational activities: Reading  Patient was born and raised in [place]    Psychiatric Mental Status Exam:  Arousal: alert  Sensorium/Orientation: oriented to grossly intact  Behavior/Cooperation: normal, cooperative   Speech: normal tone, normal rate, normal pitch, normal volume  Language: grossly intact  Mood: ok  Affect: depressed  Thought Process: normal and logical  Thought Content:   Auditory hallucinations: NO  Visual hallucinations: NO  Paranoia: NO  Delusions:  NO  Suicidal ideation: YES:      Homicidal ideation: NO  Attention/Concentration:  intact  Memory:    Recent:  Intact   Remote: Intact   3/3 immediate, 3/3 at 5 min  Fund of Knowledge: Aware of current events   Abstract reasoning: proverbs were abstract, similarities were abstract  Insight: limited awareness of illness  Judgment: Poor      Past Medical History:   Past Medical History:   Diagnosis Date    Abnormal Pap smear of cervix     ADD (attention deficit disorder)     Anxiety and depression       Laboratory Data:   Labs Reviewed   HIV 1 / 2 ANTIBODY   HEPATITIS C ANTIBODY   HEP C VIRUS HOLD SPECIMEN   CBC W/ AUTO DIFFERENTIAL   COMPREHENSIVE METABOLIC PANEL   TSH   URINALYSIS, REFLEX TO URINE CULTURE   DRUG SCREEN PANEL, URINE EMERGENCY   ALCOHOL,MEDICAL (ETHANOL)   ACETAMINOPHEN LEVEL   SARS-COV-2 RNA AMPLIFICATION, QUAL   PREGNANCY TEST, URINE RAPID       Neurological History:  Seizures: No  Head trauma: No    Allergies:   Review of patient's allergies indicates:  No Known Allergies    Medications in ER: Medications - No data to display    Medications at home:     No new subjective & objective note has been filed under this hospital service since the last note was generated.      Assessment - Diagnosis - Goals:     Diagnosis/Impression: Patient is a 32 year old woman with a past psychiatric  history of Major Depressive Disorder who presented to the ED via police due to reports of suicidal ideation, poor impulse control, and damaging property.    Rec: Recommend PEC as the patient is currently a danger to self. Recommend involuntary placement in an inpatient psychiatric facility once the patient is medically stable.      Time with patient: 20 minutes      More than 50% of the time was spent counseling/coordinating care    Consulting clinician was informed of the encounter and consult note.    Consultation ended: 11/11/2022 at 9:00 PM    Samir Greene, DO   Psychiatry  [facility]     Statement Selected

## 2022-11-12 NOTE — ED NOTES
Patient accepted by Sharon at [facility] for the service of Dr. Brooks. Report to be called to [phone]. [facility] will arrange transport

## 2022-11-12 NOTE — ED NOTES
Patient identifiers verified and correct for Venessa Hernandez.    LOC: The patient is awake, alert and aware of environment with an appropriate affect, the patient is oriented x 3 and speaking appropriately.  APPEARANCE: Patient resting comfortably and in no acute distress, patient is clean and well groomed, patient's clothing is properly fastened.  SKIN: The skin is warm and dry, color consistent with ethnicity, patient has normal skin turgor and moist mucus membranes, skin intact, no breakdown or bruising noted.  MUSCULOSKELETAL: Patient moving all extremities spontaneously.  RESPIRATORY: Airway is open and patent, respirations are spontaneous.  CARDIAC: Patient has a normal rate, no periphreal edema noted, capillary refill < 3 seconds.  ABDOMEN: Soft and non tender to palpation.

## 2022-11-14 LAB — BACTERIA UR CULT: ABNORMAL

## 2023-02-13 PROBLEM — Z00.8 MEDICAL CLEARANCE FOR PSYCHIATRIC ADMISSION: Status: RESOLVED | Noted: 2022-11-12 | Resolved: 2023-02-13

## 2023-02-13 PROBLEM — N39.0 URINARY TRACT INFECTION WITHOUT HEMATURIA: Status: RESOLVED | Noted: 2022-11-12 | Resolved: 2023-02-13

## (undated) DEVICE — SEE MEDLINE ITEM 146292

## (undated) DEVICE — SEE MEDLINE ITEM 157131

## (undated) DEVICE — SHEET DRAPE FAN-FOLDED 3/4

## (undated) DEVICE — SEE MEDLINE ITEM 152487

## (undated) DEVICE — GLOVE SURGEONS ULTRA TOUCH 5.5

## (undated) DEVICE — SEE MEDLINE ITEM 146347

## (undated) DEVICE — KIT SUCTION CATH 14FR

## (undated) DEVICE — CONTAINER SPECIMEN STRL 4OZ

## (undated) DEVICE — SEE MEDLINE ITEM 146417

## (undated) DEVICE — GLOVE SURG ULTRA TOUCH 7

## (undated) DEVICE — TIP SUCTION COAG PLASMA BLADE

## (undated) DEVICE — KIT ANTIFOG

## (undated) DEVICE — SOL NS 1000CC

## (undated) DEVICE — SEE MEDLINE ITEM 152739

## (undated) DEVICE — UNDERGLOVE BIOGEL PI SZ 6.5 LF

## (undated) DEVICE — CATH URETHRAL 12FR

## (undated) DEVICE — BLADE PEAK SURGICAL PLASMA

## (undated) DEVICE — ELECTRODE REM PLYHSV RETURN 9

## (undated) DEVICE — MANIFOLD 4 PORT

## (undated) DEVICE — SEE MEDLINE ITEM 152622